# Patient Record
Sex: FEMALE | Race: WHITE | NOT HISPANIC OR LATINO
[De-identification: names, ages, dates, MRNs, and addresses within clinical notes are randomized per-mention and may not be internally consistent; named-entity substitution may affect disease eponyms.]

---

## 2017-01-25 ENCOUNTER — FORM ENCOUNTER (OUTPATIENT)
Age: 82
End: 2017-01-25

## 2017-02-09 ENCOUNTER — FORM ENCOUNTER (OUTPATIENT)
Age: 82
End: 2017-02-09

## 2017-04-11 ENCOUNTER — APPOINTMENT (OUTPATIENT)
Dept: DERMATOLOGY | Facility: CLINIC | Age: 82
End: 2017-04-11

## 2017-08-23 ENCOUNTER — EMERGENCY (EMERGENCY)
Facility: HOSPITAL | Age: 82
LOS: 1 days | Discharge: HOME | End: 2017-08-23
Attending: EMERGENCY MEDICINE
Payer: MEDICARE

## 2017-08-23 VITALS
OXYGEN SATURATION: 98 % | RESPIRATION RATE: 16 BRPM | HEART RATE: 84 BPM | DIASTOLIC BLOOD PRESSURE: 84 MMHG | TEMPERATURE: 98 F | SYSTOLIC BLOOD PRESSURE: 132 MMHG

## 2017-08-23 VITALS
DIASTOLIC BLOOD PRESSURE: 69 MMHG | RESPIRATION RATE: 20 BRPM | SYSTOLIC BLOOD PRESSURE: 100 MMHG | HEIGHT: 64 IN | TEMPERATURE: 98 F | OXYGEN SATURATION: 97 % | HEART RATE: 90 BPM | WEIGHT: 145.06 LBS

## 2017-08-23 LAB
ANION GAP SERPL CALC-SCNC: 13 MMOL/L — SIGNIFICANT CHANGE UP (ref 5–17)
APPEARANCE UR: CLEAR — SIGNIFICANT CHANGE UP
APTT BLD: 37.4 SEC — SIGNIFICANT CHANGE UP (ref 27.5–37.4)
BACTERIA # UR AUTO: ABNORMAL
BASOPHILS # BLD AUTO: 0 K/UL — SIGNIFICANT CHANGE UP (ref 0–0.2)
BASOPHILS NFR BLD AUTO: 0.2 % — SIGNIFICANT CHANGE UP (ref 0–2)
BILIRUB UR-MCNC: ABNORMAL
BLD GP AB SCN SERPL QL: SIGNIFICANT CHANGE UP
BUN SERPL-MCNC: 31 MG/DL — HIGH (ref 8–20)
CALCIUM SERPL-MCNC: 9.2 MG/DL — SIGNIFICANT CHANGE UP (ref 8.6–10.2)
CHLORIDE SERPL-SCNC: 105 MMOL/L — SIGNIFICANT CHANGE UP (ref 98–107)
CO2 SERPL-SCNC: 24 MMOL/L — SIGNIFICANT CHANGE UP (ref 22–29)
COLOR SPEC: YELLOW — SIGNIFICANT CHANGE UP
CREAT SERPL-MCNC: 1.04 MG/DL — SIGNIFICANT CHANGE UP (ref 0.5–1.3)
DIFF PNL FLD: ABNORMAL
EOSINOPHIL # BLD AUTO: 0.1 K/UL — SIGNIFICANT CHANGE UP (ref 0–0.5)
EOSINOPHIL NFR BLD AUTO: 1.1 % — SIGNIFICANT CHANGE UP (ref 0–6)
EPI CELLS # UR: SIGNIFICANT CHANGE UP
GLUCOSE SERPL-MCNC: 102 MG/DL — SIGNIFICANT CHANGE UP (ref 70–115)
GLUCOSE UR QL: NEGATIVE MG/DL — SIGNIFICANT CHANGE UP
HCT VFR BLD CALC: 42 % — SIGNIFICANT CHANGE UP (ref 37–47)
HGB BLD-MCNC: 13.9 G/DL — SIGNIFICANT CHANGE UP (ref 12–16)
HYALINE CASTS # UR AUTO: ABNORMAL /LPF
INR BLD: 2.29 RATIO — HIGH (ref 0.88–1.16)
KETONES UR-MCNC: ABNORMAL
LEUKOCYTE ESTERASE UR-ACNC: ABNORMAL
LYMPHOCYTES # BLD AUTO: 1 K/UL — SIGNIFICANT CHANGE UP (ref 1–4.8)
LYMPHOCYTES # BLD AUTO: 11.7 % — LOW (ref 20–55)
MCHC RBC-ENTMCNC: 30.3 PG — SIGNIFICANT CHANGE UP (ref 27–31)
MCHC RBC-ENTMCNC: 33.1 G/DL — SIGNIFICANT CHANGE UP (ref 32–36)
MCV RBC AUTO: 91.5 FL — SIGNIFICANT CHANGE UP (ref 81–99)
MONOCYTES # BLD AUTO: 0.8 K/UL — SIGNIFICANT CHANGE UP (ref 0–0.8)
MONOCYTES NFR BLD AUTO: 8.7 % — SIGNIFICANT CHANGE UP (ref 3–10)
NEUTROPHILS # BLD AUTO: 7 K/UL — SIGNIFICANT CHANGE UP (ref 1.8–8)
NEUTROPHILS NFR BLD AUTO: 78.2 % — HIGH (ref 37–73)
NITRITE UR-MCNC: NEGATIVE — SIGNIFICANT CHANGE UP
PH UR: 5 — SIGNIFICANT CHANGE UP (ref 5–8)
PLATELET # BLD AUTO: 225 K/UL — SIGNIFICANT CHANGE UP (ref 150–400)
POTASSIUM SERPL-MCNC: 4.4 MMOL/L — SIGNIFICANT CHANGE UP (ref 3.5–5.3)
POTASSIUM SERPL-SCNC: 4.4 MMOL/L — SIGNIFICANT CHANGE UP (ref 3.5–5.3)
PROT UR-MCNC: 100 MG/DL
PROTHROM AB SERPL-ACNC: 25.6 SEC — HIGH (ref 9.8–12.7)
RBC # BLD: 4.59 M/UL — SIGNIFICANT CHANGE UP (ref 4.4–5.2)
RBC # FLD: 14.2 % — SIGNIFICANT CHANGE UP (ref 11–15.6)
RBC CASTS # UR COMP ASSIST: ABNORMAL /HPF (ref 0–4)
SODIUM SERPL-SCNC: 142 MMOL/L — SIGNIFICANT CHANGE UP (ref 135–145)
SP GR SPEC: 1.02 — SIGNIFICANT CHANGE UP (ref 1.01–1.02)
TYPE + AB SCN PNL BLD: SIGNIFICANT CHANGE UP
UROBILINOGEN FLD QL: 1 MG/DL
WBC # BLD: 9 K/UL — SIGNIFICANT CHANGE UP (ref 4.8–10.8)
WBC # FLD AUTO: 9 K/UL — SIGNIFICANT CHANGE UP (ref 4.8–10.8)
WBC UR QL: ABNORMAL

## 2017-08-23 PROCEDURE — 80048 BASIC METABOLIC PNL TOTAL CA: CPT

## 2017-08-23 PROCEDURE — 70450 CT HEAD/BRAIN W/O DYE: CPT | Mod: 26

## 2017-08-23 PROCEDURE — 87086 URINE CULTURE/COLONY COUNT: CPT

## 2017-08-23 PROCEDURE — 86900 BLOOD TYPING SEROLOGIC ABO: CPT

## 2017-08-23 PROCEDURE — 76856 US EXAM PELVIC COMPLETE: CPT

## 2017-08-23 PROCEDURE — 36415 COLL VENOUS BLD VENIPUNCTURE: CPT

## 2017-08-23 PROCEDURE — 99284 EMERGENCY DEPT VISIT MOD MDM: CPT

## 2017-08-23 PROCEDURE — 85027 COMPLETE CBC AUTOMATED: CPT

## 2017-08-23 PROCEDURE — 87186 SC STD MICRODIL/AGAR DIL: CPT

## 2017-08-23 PROCEDURE — 85610 PROTHROMBIN TIME: CPT

## 2017-08-23 PROCEDURE — 76856 US EXAM PELVIC COMPLETE: CPT | Mod: 26

## 2017-08-23 PROCEDURE — 86901 BLOOD TYPING SEROLOGIC RH(D): CPT

## 2017-08-23 PROCEDURE — 85730 THROMBOPLASTIN TIME PARTIAL: CPT

## 2017-08-23 PROCEDURE — 76830 TRANSVAGINAL US NON-OB: CPT | Mod: 26

## 2017-08-23 PROCEDURE — 70450 CT HEAD/BRAIN W/O DYE: CPT

## 2017-08-23 PROCEDURE — 76830 TRANSVAGINAL US NON-OB: CPT

## 2017-08-23 PROCEDURE — 81001 URINALYSIS AUTO W/SCOPE: CPT

## 2017-08-23 PROCEDURE — 99284 EMERGENCY DEPT VISIT MOD MDM: CPT | Mod: 25

## 2017-08-23 PROCEDURE — 86850 RBC ANTIBODY SCREEN: CPT

## 2017-08-23 RX ORDER — NITROFURANTOIN MACROCRYSTAL 50 MG
1 CAPSULE ORAL
Qty: 14 | Refills: 0
Start: 2017-08-23 | End: 2017-08-30

## 2017-08-23 RX ORDER — NITROFURANTOIN MACROCRYSTAL 50 MG
100 CAPSULE ORAL ONCE
Qty: 0 | Refills: 0 | Status: COMPLETED | OUTPATIENT
Start: 2017-08-23 | End: 2017-08-23

## 2017-08-23 RX ADMIN — Medication 100 MILLIGRAM(S): at 21:00

## 2017-08-23 NOTE — ED STATDOCS - CONDUCTED A DETAILED DISCUSSION WITH PATIENT AND/OR GUARDIAN REGARDING, MDM
radiology results/lab results radiology results/return to ED if symptoms worsen, persist or questions arise/lab results/need for outpatient follow-up

## 2017-08-23 NOTE — ED STATDOCS - MEDICAL DECISION MAKING DETAILS
hemorraghic cystitis vs endometrial ca vs super therapeutic INR. Labs, Ultra sound and re-evaluate. Painless vaginal bleeding. Pt found to ave UTI, which we will tx. Arranged expedient outpat f/u with GYN within 48 hours for occult malignancy work up.  Pt is lucid and wants to leave. Instructed on risks given head trauma on blood thinner and she want to AMA.

## 2017-08-23 NOTE — ED STATDOCS - NS_ ATTENDINGSCRIBEDETAILS _ED_A_ED_FT
I, Velasquez Jiménez, performed the initial face to face bedside interview with this patient regarding history of present illness, review of symptoms and relevant past medical, social and family history.  I completed an independent physical examination.  I was the initial provider who evaluated this patient. I have signed out the follow up of any pending tests (i.e. labs, radiological studies) to the ACP.  I have communicated the patient’s plan of care and disposition with the ACP.

## 2017-08-23 NOTE — ED STATDOCS - PROGRESS NOTE DETAILS
PA NOTE: Pt discussed with attending at length, HPI/ROS/PE confirmed, pt seen resting comfortably in no acute distress complaining of painless vaginal bleeding that started yesterday, Pt denies any other complaints at this time, will revaluate after results come back. PA NOTE: PA called to bed side after nurse reports pt fell while trying to sit in chair, pt sat down on open foot rest, chair tipped pt states she hit her head when she fell, she first fell on her bottom then hand then head, she denies LOC whiteness say she did not LOC, pt denies injury other than small abrasion on her Rt wrist. PT denies HA, N/V, Dizziness, Loss of ROM, Neck pain, back pain, joint pain, numbness tingling.   HEAD: atraumatic, non tender, MS: NANY, strength intact, no tenderness, no anatomical snuff box tenderness. Neuro: no focal deficits, a&o x3.  PLAN: CT head revaluate. PA NOTE: Dr. Jaydon Jiménez OBGYN oncology was called about pt abnormal US findings, he recommended pt be seen in his office at the end of the Week. PA NOTE: PT resting comfortably in no acute distress, pt informed of all findings, pt refuses to stay for observation and will be leaving AMA, PT will be given ABx, educated about when to return to the ED, Pt is aware of risks associated with leaving AMA and still would like to leave, PT verbalizes she understands all results and instructions.

## 2017-08-23 NOTE — ED ADULT NURSE REASSESSMENT NOTE - NS ED NURSE REASSESS COMMENT FT1
pt trip and fell trying to sit into chair. pt has mild edema and ecchymosis to rt hand and wrist, small abrasion as well. pt denies loc. pt reports hit head during fall. pt has no complaints at this time. julien dumont and eneida michaels informed. pt wassafley placed back into chair. a and ox3. breathing even and unlabored. will continue to monitor.

## 2017-08-23 NOTE — ED STATDOCS - OBJECTIVE STATEMENT
87 y/o F pt with a PMHx of Parkinson's disease, Afib presents to the ED c/o vaginal bleeding that onset 1 day. She states that she went threw 3 pairs of underwear and 1 pad. Pt is on Warfarin. Yesterday she had a nurse draw her blood 1 day ago, when she was informed today that her blood is too thin. Denies recent weight loss, n/v/d/c, burning on urination, melena, fever, chills, abdominal pain or any other complaints. NKDA.  Denies Hx of hysterectomy.

## 2017-08-29 ENCOUNTER — FORM ENCOUNTER (OUTPATIENT)
Age: 82
End: 2017-08-29

## 2017-09-04 ENCOUNTER — FORM ENCOUNTER (OUTPATIENT)
Age: 82
End: 2017-09-04

## 2017-09-11 ENCOUNTER — APPOINTMENT (OUTPATIENT)
Dept: ORTHOPEDIC SURGERY | Facility: CLINIC | Age: 82
End: 2017-09-11
Payer: MEDICARE

## 2017-09-11 VITALS
HEART RATE: 80 BPM | WEIGHT: 145 LBS | HEIGHT: 64 IN | SYSTOLIC BLOOD PRESSURE: 133 MMHG | DIASTOLIC BLOOD PRESSURE: 84 MMHG | BODY MASS INDEX: 24.75 KG/M2

## 2017-09-11 DIAGNOSIS — Z60.2 PROBLEMS RELATED TO LIVING ALONE: ICD-10-CM

## 2017-09-11 DIAGNOSIS — Z78.9 OTHER SPECIFIED HEALTH STATUS: ICD-10-CM

## 2017-09-11 DIAGNOSIS — Z87.891 PERSONAL HISTORY OF NICOTINE DEPENDENCE: ICD-10-CM

## 2017-09-11 DIAGNOSIS — Z86.39 PERSONAL HISTORY OF OTHER ENDOCRINE, NUTRITIONAL AND METABOLIC DISEASE: ICD-10-CM

## 2017-09-11 DIAGNOSIS — Z86.79 PERSONAL HISTORY OF OTHER DISEASES OF THE CIRCULATORY SYSTEM: ICD-10-CM

## 2017-09-11 DIAGNOSIS — G20 PARKINSON'S DISEASE: ICD-10-CM

## 2017-09-11 PROCEDURE — 99204 OFFICE O/P NEW MOD 45 MIN: CPT | Mod: 25

## 2017-09-11 PROCEDURE — 73110 X-RAY EXAM OF WRIST: CPT | Mod: LT

## 2017-09-11 PROCEDURE — 20550 NJX 1 TENDON SHEATH/LIGAMENT: CPT | Mod: LT

## 2017-09-11 SDOH — SOCIAL STABILITY - SOCIAL INSECURITY: PROBLEMS RELATED TO LIVING ALONE: Z60.2

## 2017-09-12 PROBLEM — Z00.00 ENCOUNTER FOR PREVENTIVE HEALTH EXAMINATION: Noted: 2017-09-12

## 2017-09-13 ENCOUNTER — FORM ENCOUNTER (OUTPATIENT)
Age: 82
End: 2017-09-13

## 2017-09-13 ENCOUNTER — OUTPATIENT (OUTPATIENT)
Dept: OUTPATIENT SERVICES | Facility: HOSPITAL | Age: 82
LOS: 1 days | End: 2017-09-13

## 2017-09-13 ENCOUNTER — APPOINTMENT (OUTPATIENT)
Dept: MRI IMAGING | Facility: CLINIC | Age: 82
End: 2017-09-13
Payer: MEDICARE

## 2017-09-13 DIAGNOSIS — D25.9 LEIOMYOMA OF UTERUS, UNSPECIFIED: ICD-10-CM

## 2017-09-13 PROCEDURE — 72197 MRI PELVIS W/O & W/DYE: CPT | Mod: 26

## 2017-09-19 ENCOUNTER — FORM ENCOUNTER (OUTPATIENT)
Age: 82
End: 2017-09-19

## 2017-09-25 ENCOUNTER — APPOINTMENT (OUTPATIENT)
Dept: PHYSICAL MEDICINE AND REHAB | Facility: CLINIC | Age: 82
End: 2017-09-25
Payer: MEDICARE

## 2017-09-25 VITALS
WEIGHT: 145 LBS | HEART RATE: 103 BPM | HEIGHT: 64 IN | OXYGEN SATURATION: 95 % | BODY MASS INDEX: 24.75 KG/M2 | DIASTOLIC BLOOD PRESSURE: 83 MMHG | SYSTOLIC BLOOD PRESSURE: 138 MMHG

## 2017-09-25 PROCEDURE — 95909 NRV CNDJ TST 5-6 STUDIES: CPT

## 2017-09-25 PROCEDURE — 95886 MUSC TEST DONE W/N TEST COMP: CPT | Mod: RT

## 2017-10-02 ENCOUNTER — FORM ENCOUNTER (OUTPATIENT)
Age: 82
End: 2017-10-02

## 2017-10-10 ENCOUNTER — RESULT REVIEW (OUTPATIENT)
Age: 82
End: 2017-10-10

## 2017-10-10 ENCOUNTER — APPOINTMENT (OUTPATIENT)
Dept: DERMATOLOGY | Facility: CLINIC | Age: 82
End: 2017-10-10
Payer: MEDICARE

## 2017-10-10 PROCEDURE — 17110 DESTRUCTION B9 LES UP TO 14: CPT

## 2017-10-10 PROCEDURE — 99213 OFFICE O/P EST LOW 20 MIN: CPT | Mod: 25

## 2017-11-03 ENCOUNTER — APPOINTMENT (OUTPATIENT)
Dept: ORTHOPEDIC SURGERY | Facility: CLINIC | Age: 82
End: 2017-11-03
Payer: MEDICARE

## 2017-11-03 VITALS
DIASTOLIC BLOOD PRESSURE: 84 MMHG | WEIGHT: 145 LBS | SYSTOLIC BLOOD PRESSURE: 138 MMHG | HEART RATE: 82 BPM | BODY MASS INDEX: 24.75 KG/M2 | HEIGHT: 64 IN

## 2017-11-03 PROCEDURE — 99212 OFFICE O/P EST SF 10 MIN: CPT | Mod: 25

## 2017-11-03 PROCEDURE — 20600 DRAIN/INJ JOINT/BURSA W/O US: CPT | Mod: RT

## 2017-11-05 ENCOUNTER — FORM ENCOUNTER (OUTPATIENT)
Age: 82
End: 2017-11-05

## 2017-11-21 ENCOUNTER — FORM ENCOUNTER (OUTPATIENT)
Age: 82
End: 2017-11-21

## 2018-01-08 ENCOUNTER — FORM ENCOUNTER (OUTPATIENT)
Age: 83
End: 2018-01-08

## 2018-01-09 ENCOUNTER — OUTPATIENT (OUTPATIENT)
Dept: OUTPATIENT SERVICES | Facility: HOSPITAL | Age: 83
LOS: 1 days | End: 2018-01-09
Payer: MEDICARE

## 2018-01-09 DIAGNOSIS — Z01.818 ENCOUNTER FOR OTHER PREPROCEDURAL EXAMINATION: ICD-10-CM

## 2018-01-09 LAB
ANION GAP SERPL CALC-SCNC: 12 MMOL/L — SIGNIFICANT CHANGE UP (ref 5–17)
APTT BLD: 47 SEC — HIGH (ref 27.5–37.4)
BASOPHILS # BLD AUTO: 0 K/UL — SIGNIFICANT CHANGE UP (ref 0–0.2)
BASOPHILS NFR BLD AUTO: 0.4 % — SIGNIFICANT CHANGE UP (ref 0–2)
BUN SERPL-MCNC: 23 MG/DL — HIGH (ref 8–20)
CALCIUM SERPL-MCNC: 9 MG/DL — SIGNIFICANT CHANGE UP (ref 8.6–10.2)
CHLORIDE SERPL-SCNC: 104 MMOL/L — SIGNIFICANT CHANGE UP (ref 98–107)
CO2 SERPL-SCNC: 27 MMOL/L — SIGNIFICANT CHANGE UP (ref 22–29)
CREAT SERPL-MCNC: 0.74 MG/DL — SIGNIFICANT CHANGE UP (ref 0.5–1.3)
EOSINOPHIL # BLD AUTO: 0 K/UL — SIGNIFICANT CHANGE UP (ref 0–0.5)
EOSINOPHIL NFR BLD AUTO: 0.3 % — SIGNIFICANT CHANGE UP (ref 0–6)
GLUCOSE SERPL-MCNC: 91 MG/DL — SIGNIFICANT CHANGE UP (ref 70–115)
HCT VFR BLD CALC: 42.4 % — SIGNIFICANT CHANGE UP (ref 37–47)
HGB BLD-MCNC: 13.9 G/DL — SIGNIFICANT CHANGE UP (ref 12–16)
INR BLD: 4.95 RATIO — HIGH (ref 0.88–1.16)
LYMPHOCYTES # BLD AUTO: 1.1 K/UL — SIGNIFICANT CHANGE UP (ref 1–4.8)
LYMPHOCYTES # BLD AUTO: 16.3 % — LOW (ref 20–55)
MCHC RBC-ENTMCNC: 30.3 PG — SIGNIFICANT CHANGE UP (ref 27–31)
MCHC RBC-ENTMCNC: 32.8 G/DL — SIGNIFICANT CHANGE UP (ref 32–36)
MCV RBC AUTO: 92.4 FL — SIGNIFICANT CHANGE UP (ref 81–99)
MONOCYTES # BLD AUTO: 0.6 K/UL — SIGNIFICANT CHANGE UP (ref 0–0.8)
MONOCYTES NFR BLD AUTO: 8.7 % — SIGNIFICANT CHANGE UP (ref 3–10)
NEUTROPHILS # BLD AUTO: 5.2 K/UL — SIGNIFICANT CHANGE UP (ref 1.8–8)
NEUTROPHILS NFR BLD AUTO: 74.3 % — HIGH (ref 37–73)
PLATELET # BLD AUTO: 241 K/UL — SIGNIFICANT CHANGE UP (ref 150–400)
POTASSIUM SERPL-MCNC: 4.6 MMOL/L — SIGNIFICANT CHANGE UP (ref 3.5–5.3)
POTASSIUM SERPL-SCNC: 4.6 MMOL/L — SIGNIFICANT CHANGE UP (ref 3.5–5.3)
PROTHROM AB SERPL-ACNC: 56.2 SEC — HIGH (ref 9.8–12.7)
RBC # BLD: 4.59 M/UL — SIGNIFICANT CHANGE UP (ref 4.4–5.2)
RBC # FLD: 13.5 % — SIGNIFICANT CHANGE UP (ref 11–15.6)
SODIUM SERPL-SCNC: 143 MMOL/L — SIGNIFICANT CHANGE UP (ref 135–145)
WBC # BLD: 6.9 K/UL — SIGNIFICANT CHANGE UP (ref 4.8–10.8)
WBC # FLD AUTO: 6.9 K/UL — SIGNIFICANT CHANGE UP (ref 4.8–10.8)

## 2018-01-09 PROCEDURE — 85027 COMPLETE CBC AUTOMATED: CPT

## 2018-01-09 PROCEDURE — 93010 ELECTROCARDIOGRAM REPORT: CPT

## 2018-01-09 PROCEDURE — 71046 X-RAY EXAM CHEST 2 VIEWS: CPT

## 2018-01-09 PROCEDURE — 93005 ELECTROCARDIOGRAM TRACING: CPT

## 2018-01-09 PROCEDURE — G0463: CPT

## 2018-01-09 PROCEDURE — 36415 COLL VENOUS BLD VENIPUNCTURE: CPT

## 2018-01-09 PROCEDURE — 85610 PROTHROMBIN TIME: CPT

## 2018-01-09 PROCEDURE — 85730 THROMBOPLASTIN TIME PARTIAL: CPT

## 2018-01-09 PROCEDURE — 80048 BASIC METABOLIC PNL TOTAL CA: CPT

## 2018-01-09 PROCEDURE — 71046 X-RAY EXAM CHEST 2 VIEWS: CPT | Mod: 26

## 2018-01-10 ENCOUNTER — APPOINTMENT (OUTPATIENT)
Dept: CARDIOLOGY | Facility: CLINIC | Age: 83
End: 2018-01-10
Payer: MEDICARE

## 2018-01-10 ENCOUNTER — NON-APPOINTMENT (OUTPATIENT)
Age: 83
End: 2018-01-10

## 2018-01-10 VITALS
HEART RATE: 77 BPM | WEIGHT: 139 LBS | HEIGHT: 64 IN | DIASTOLIC BLOOD PRESSURE: 68 MMHG | OXYGEN SATURATION: 99 % | SYSTOLIC BLOOD PRESSURE: 110 MMHG | BODY MASS INDEX: 23.73 KG/M2

## 2018-01-10 DIAGNOSIS — Z63.4 DISAPPEARANCE AND DEATH OF FAMILY MEMBER: ICD-10-CM

## 2018-01-10 DIAGNOSIS — Z09 ENCOUNTER FOR FOLLOW-UP EXAMINATION AFTER COMPLETED TREATMENT FOR CONDITIONS OTHER THAN MALIGNANT NEOPLASM: ICD-10-CM

## 2018-01-10 DIAGNOSIS — I10 ESSENTIAL (PRIMARY) HYPERTENSION: ICD-10-CM

## 2018-01-10 DIAGNOSIS — I48.0 PAROXYSMAL ATRIAL FIBRILLATION: ICD-10-CM

## 2018-01-10 PROCEDURE — 93000 ELECTROCARDIOGRAM COMPLETE: CPT

## 2018-01-10 PROCEDURE — 99203 OFFICE O/P NEW LOW 30 MIN: CPT | Mod: 25

## 2018-01-10 RX ORDER — ACETAMINOPHEN 325 MG/1
TABLET, FILM COATED ORAL
Refills: 0 | Status: COMPLETED | COMMUNITY
End: 2018-01-10

## 2018-01-10 SDOH — SOCIAL STABILITY - SOCIAL INSECURITY: DISSAPEARANCE AND DEATH OF FAMILY MEMBER: Z63.4

## 2018-01-16 ENCOUNTER — RESULT REVIEW (OUTPATIENT)
Age: 83
End: 2018-01-16

## 2018-01-16 ENCOUNTER — RX RENEWAL (OUTPATIENT)
Age: 83
End: 2018-01-16

## 2018-01-16 ENCOUNTER — APPOINTMENT (OUTPATIENT)
Dept: ORTHOPEDIC SURGERY | Facility: AMBULATORY SURGERY CENTER | Age: 83
End: 2018-01-16
Payer: MEDICARE

## 2018-01-16 PROCEDURE — 26055 INCISE FINGER TENDON SHEATH: CPT | Mod: F2

## 2018-01-16 RX ORDER — HYDROCODONE BITARTRATE AND ACETAMINOPHEN 5; 325 MG/1; MG/1
5-325 TABLET ORAL
Qty: 8 | Refills: 0 | Status: ACTIVE | COMMUNITY
Start: 2018-01-16 | End: 1900-01-01

## 2018-01-17 RX ORDER — RIVAROXABAN 20 MG/1
20 TABLET, FILM COATED ORAL
Qty: 30 | Refills: 3 | Status: DISCONTINUED | COMMUNITY
Start: 2018-01-16 | End: 2018-01-17

## 2018-01-26 ENCOUNTER — APPOINTMENT (OUTPATIENT)
Dept: ORTHOPEDIC SURGERY | Facility: CLINIC | Age: 83
End: 2018-01-26
Payer: MEDICARE

## 2018-01-26 VITALS
SYSTOLIC BLOOD PRESSURE: 128 MMHG | HEIGHT: 64 IN | BODY MASS INDEX: 23.73 KG/M2 | HEART RATE: 87 BPM | WEIGHT: 139 LBS | DIASTOLIC BLOOD PRESSURE: 82 MMHG

## 2018-01-26 DIAGNOSIS — M65.332 TRIGGER FINGER, LEFT MIDDLE FINGER: ICD-10-CM

## 2018-01-26 PROCEDURE — 99024 POSTOP FOLLOW-UP VISIT: CPT

## 2018-01-26 RX ORDER — LISINOPRIL 10 MG/1
10 TABLET ORAL
Qty: 90 | Refills: 0 | Status: ACTIVE | COMMUNITY
Start: 2017-01-24

## 2018-01-26 RX ORDER — ATORVASTATIN CALCIUM 10 MG/1
10 TABLET, FILM COATED ORAL
Qty: 90 | Refills: 0 | Status: ACTIVE | COMMUNITY
Start: 2017-01-24

## 2018-01-26 RX ORDER — DILTIAZEM HYDROCHLORIDE 360 MG/1
360 CAPSULE, COATED, EXTENDED RELEASE ORAL
Qty: 90 | Refills: 0 | Status: ACTIVE | COMMUNITY
Start: 2017-01-24

## 2018-02-26 ENCOUNTER — APPOINTMENT (OUTPATIENT)
Dept: ORTHOPEDIC SURGERY | Facility: CLINIC | Age: 83
End: 2018-02-26
Payer: MEDICARE

## 2018-02-26 VITALS
HEIGHT: 64 IN | SYSTOLIC BLOOD PRESSURE: 135 MMHG | BODY MASS INDEX: 23.73 KG/M2 | HEART RATE: 66 BPM | DIASTOLIC BLOOD PRESSURE: 82 MMHG | WEIGHT: 139 LBS

## 2018-02-26 DIAGNOSIS — M25.531 PAIN IN RIGHT WRIST: ICD-10-CM

## 2018-02-26 PROCEDURE — 99215 OFFICE O/P EST HI 40 MIN: CPT | Mod: 24

## 2018-02-26 PROCEDURE — 73110 X-RAY EXAM OF WRIST: CPT | Mod: RT

## 2018-03-08 ENCOUNTER — OUTPATIENT (OUTPATIENT)
Dept: OUTPATIENT SERVICES | Facility: HOSPITAL | Age: 83
LOS: 1 days | End: 2018-03-08
Payer: MEDICARE

## 2018-03-08 DIAGNOSIS — Z01.818 ENCOUNTER FOR OTHER PREPROCEDURAL EXAMINATION: ICD-10-CM

## 2018-03-08 LAB
ANION GAP SERPL CALC-SCNC: 12 MMOL/L — SIGNIFICANT CHANGE UP (ref 5–17)
APTT BLD: 43.5 SEC — HIGH (ref 27.5–37.4)
BASOPHILS # BLD AUTO: 0 K/UL — SIGNIFICANT CHANGE UP (ref 0–0.2)
BASOPHILS NFR BLD AUTO: 0.4 % — SIGNIFICANT CHANGE UP (ref 0–2)
BUN SERPL-MCNC: 25 MG/DL — HIGH (ref 8–20)
CALCIUM SERPL-MCNC: 9.3 MG/DL — SIGNIFICANT CHANGE UP (ref 8.6–10.2)
CHLORIDE SERPL-SCNC: 103 MMOL/L — SIGNIFICANT CHANGE UP (ref 98–107)
CO2 SERPL-SCNC: 26 MMOL/L — SIGNIFICANT CHANGE UP (ref 22–29)
CREAT SERPL-MCNC: 0.74 MG/DL — SIGNIFICANT CHANGE UP (ref 0.5–1.3)
EOSINOPHIL # BLD AUTO: 0 K/UL — SIGNIFICANT CHANGE UP (ref 0–0.5)
EOSINOPHIL NFR BLD AUTO: 0.7 % — SIGNIFICANT CHANGE UP (ref 0–6)
GLUCOSE SERPL-MCNC: 116 MG/DL — HIGH (ref 70–115)
HCT VFR BLD CALC: 41.7 % — SIGNIFICANT CHANGE UP (ref 37–47)
HGB BLD-MCNC: 13.8 G/DL — SIGNIFICANT CHANGE UP (ref 12–16)
INR BLD: 3.09 RATIO — HIGH (ref 0.88–1.16)
LYMPHOCYTES # BLD AUTO: 0.8 K/UL — LOW (ref 1–4.8)
LYMPHOCYTES # BLD AUTO: 14.3 % — LOW (ref 20–55)
MCHC RBC-ENTMCNC: 30.6 PG — SIGNIFICANT CHANGE UP (ref 27–31)
MCHC RBC-ENTMCNC: 33.1 G/DL — SIGNIFICANT CHANGE UP (ref 32–36)
MCV RBC AUTO: 92.5 FL — SIGNIFICANT CHANGE UP (ref 81–99)
MONOCYTES # BLD AUTO: 0.5 K/UL — SIGNIFICANT CHANGE UP (ref 0–0.8)
MONOCYTES NFR BLD AUTO: 9.1 % — SIGNIFICANT CHANGE UP (ref 3–10)
NEUTROPHILS # BLD AUTO: 4.2 K/UL — SIGNIFICANT CHANGE UP (ref 1.8–8)
NEUTROPHILS NFR BLD AUTO: 75.5 % — HIGH (ref 37–73)
PLATELET # BLD AUTO: 203 K/UL — SIGNIFICANT CHANGE UP (ref 150–400)
POTASSIUM SERPL-MCNC: 4.7 MMOL/L — SIGNIFICANT CHANGE UP (ref 3.5–5.3)
POTASSIUM SERPL-SCNC: 4.7 MMOL/L — SIGNIFICANT CHANGE UP (ref 3.5–5.3)
PROTHROM AB SERPL-ACNC: 34.8 SEC — HIGH (ref 9.8–12.7)
RBC # BLD: 4.51 M/UL — SIGNIFICANT CHANGE UP (ref 4.4–5.2)
RBC # FLD: 13.7 % — SIGNIFICANT CHANGE UP (ref 11–15.6)
SODIUM SERPL-SCNC: 141 MMOL/L — SIGNIFICANT CHANGE UP (ref 135–145)
WBC # BLD: 5.6 K/UL — SIGNIFICANT CHANGE UP (ref 4.8–10.8)
WBC # FLD AUTO: 5.6 K/UL — SIGNIFICANT CHANGE UP (ref 4.8–10.8)

## 2018-03-08 PROCEDURE — 85730 THROMBOPLASTIN TIME PARTIAL: CPT

## 2018-03-08 PROCEDURE — 36415 COLL VENOUS BLD VENIPUNCTURE: CPT

## 2018-03-08 PROCEDURE — 85610 PROTHROMBIN TIME: CPT

## 2018-03-08 PROCEDURE — 80048 BASIC METABOLIC PNL TOTAL CA: CPT

## 2018-03-08 PROCEDURE — 85027 COMPLETE CBC AUTOMATED: CPT

## 2018-03-08 PROCEDURE — G0463: CPT

## 2018-03-27 ENCOUNTER — APPOINTMENT (OUTPATIENT)
Dept: ORTHOPEDIC SURGERY | Facility: AMBULATORY SURGERY CENTER | Age: 83
End: 2018-03-27

## 2018-03-27 PROCEDURE — 64721 CARPAL TUNNEL SURGERY: CPT | Mod: 79,RT

## 2018-04-10 ENCOUNTER — FORM ENCOUNTER (OUTPATIENT)
Age: 83
End: 2018-04-10

## 2018-04-10 ENCOUNTER — APPOINTMENT (OUTPATIENT)
Dept: ORTHOPEDIC SURGERY | Facility: CLINIC | Age: 83
End: 2018-04-10
Payer: MEDICARE

## 2018-04-10 VITALS
WEIGHT: 139 LBS | HEIGHT: 64 IN | BODY MASS INDEX: 23.73 KG/M2 | HEART RATE: 92 BPM | DIASTOLIC BLOOD PRESSURE: 76 MMHG | SYSTOLIC BLOOD PRESSURE: 106 MMHG

## 2018-04-10 DIAGNOSIS — G56.01 CARPAL TUNNEL SYNDROME, RIGHT UPPER LIMB: ICD-10-CM

## 2018-04-10 DIAGNOSIS — Z98.890 OTHER SPECIFIED POSTPROCEDURAL STATES: ICD-10-CM

## 2018-04-10 PROCEDURE — 99024 POSTOP FOLLOW-UP VISIT: CPT

## 2018-05-22 ENCOUNTER — RESULT REVIEW (OUTPATIENT)
Age: 83
End: 2018-05-22

## 2018-05-22 ENCOUNTER — APPOINTMENT (OUTPATIENT)
Dept: DERMATOLOGY | Facility: CLINIC | Age: 83
End: 2018-05-22
Payer: MEDICARE

## 2018-05-22 PROCEDURE — 11100 BX SKIN SUBCUTANEOUS&/MUCOUS MEMBRANE 1 LESION: CPT

## 2018-05-22 PROCEDURE — 99213 OFFICE O/P EST LOW 20 MIN: CPT | Mod: 25

## 2018-05-31 ENCOUNTER — FORM ENCOUNTER (OUTPATIENT)
Age: 83
End: 2018-05-31

## 2018-07-16 PROBLEM — I10 ESSENTIAL (PRIMARY) HYPERTENSION: Chronic | Status: ACTIVE | Noted: 2017-08-23

## 2018-07-16 PROBLEM — I48.91 UNSPECIFIED ATRIAL FIBRILLATION: Chronic | Status: ACTIVE | Noted: 2017-08-23

## 2018-07-16 PROBLEM — G20 PARKINSON'S DISEASE: Chronic | Status: ACTIVE | Noted: 2017-08-23

## 2018-08-01 ENCOUNTER — RECORD ABSTRACTING (OUTPATIENT)
Age: 83
End: 2018-08-01

## 2018-08-01 DIAGNOSIS — Z86.2 PERSONAL HISTORY OF DISEASES OF THE BLOOD AND BLOOD-FORMING ORGANS AND CERTAIN DISORDERS INVOLVING THE IMMUNE MECHANISM: ICD-10-CM

## 2018-08-01 DIAGNOSIS — M19.90 UNSPECIFIED OSTEOARTHRITIS, UNSPECIFIED SITE: ICD-10-CM

## 2018-08-01 DIAGNOSIS — Z90.49 ACQUIRED ABSENCE OF OTHER SPECIFIED PARTS OF DIGESTIVE TRACT: ICD-10-CM

## 2018-08-01 DIAGNOSIS — N95.0 POSTMENOPAUSAL BLEEDING: ICD-10-CM

## 2018-08-01 DIAGNOSIS — K21.9 GASTRO-ESOPHAGEAL REFLUX DISEASE W/OUT ESOPHAGITIS: ICD-10-CM

## 2018-08-01 DIAGNOSIS — E78.5 HYPERLIPIDEMIA, UNSPECIFIED: ICD-10-CM

## 2018-08-01 RX ORDER — OMEPRAZOLE 40 MG/1
40 CAPSULE, DELAYED RELEASE ORAL
Refills: 0 | Status: ACTIVE | COMMUNITY

## 2018-10-09 ENCOUNTER — FORM ENCOUNTER (OUTPATIENT)
Age: 83
End: 2018-10-09

## 2018-11-13 ENCOUNTER — APPOINTMENT (OUTPATIENT)
Dept: DERMATOLOGY | Facility: CLINIC | Age: 83
End: 2018-11-13
Payer: MEDICARE

## 2018-11-13 PROCEDURE — 99214 OFFICE O/P EST MOD 30 MIN: CPT

## 2018-11-14 ENCOUNTER — APPOINTMENT (OUTPATIENT)
Dept: GYNECOLOGIC ONCOLOGY | Facility: CLINIC | Age: 83
End: 2018-11-14
Payer: MEDICARE

## 2018-11-14 VITALS
SYSTOLIC BLOOD PRESSURE: 125 MMHG | WEIGHT: 145 LBS | RESPIRATION RATE: 16 BRPM | HEIGHT: 62 IN | BODY MASS INDEX: 26.68 KG/M2 | HEART RATE: 70 BPM | DIASTOLIC BLOOD PRESSURE: 76 MMHG | OXYGEN SATURATION: 98 %

## 2018-11-14 DIAGNOSIS — Z98.890 OTHER SPECIFIED POSTPROCEDURAL STATES: ICD-10-CM

## 2018-11-14 PROCEDURE — 76857 US EXAM PELVIC LIMITED: CPT | Mod: 59

## 2018-11-14 PROCEDURE — 99214 OFFICE O/P EST MOD 30 MIN: CPT | Mod: 25

## 2018-11-14 PROCEDURE — 76830 TRANSVAGINAL US NON-OB: CPT | Mod: 59

## 2018-11-14 RX ORDER — APIXABAN 5 MG/1
5 TABLET, FILM COATED ORAL
Qty: 60 | Refills: 3 | Status: DISCONTINUED | COMMUNITY
Start: 2018-01-17 | End: 2018-11-14

## 2018-11-14 RX ORDER — WARFARIN SODIUM 6 MG/1
TABLET ORAL
Refills: 0 | Status: ACTIVE | COMMUNITY

## 2019-05-15 ENCOUNTER — APPOINTMENT (OUTPATIENT)
Dept: GYNECOLOGIC ONCOLOGY | Facility: CLINIC | Age: 84
End: 2019-05-15
Payer: MEDICARE

## 2019-05-15 VITALS
RESPIRATION RATE: 16 BRPM | DIASTOLIC BLOOD PRESSURE: 79 MMHG | BODY MASS INDEX: 24.84 KG/M2 | SYSTOLIC BLOOD PRESSURE: 131 MMHG | WEIGHT: 135 LBS | HEIGHT: 62 IN | HEART RATE: 98 BPM | OXYGEN SATURATION: 97 %

## 2019-05-15 DIAGNOSIS — N83.209 UNSPECIFIED OVARIAN CYST, UNSPECIFIED SIDE: ICD-10-CM

## 2019-05-15 PROCEDURE — 99214 OFFICE O/P EST MOD 30 MIN: CPT | Mod: 25

## 2019-05-15 PROCEDURE — 76830 TRANSVAGINAL US NON-OB: CPT | Mod: 59

## 2019-05-15 PROCEDURE — 76857 US EXAM PELVIC LIMITED: CPT | Mod: 59

## 2019-05-16 ENCOUNTER — APPOINTMENT (OUTPATIENT)
Dept: DERMATOLOGY | Facility: CLINIC | Age: 84
End: 2019-05-16

## 2019-05-16 NOTE — ASSESSMENT
[FreeTextEntry1] : 89 y/o with stable left adnexal cyst and fibroids, without gynecological complaints or symptoms \par \par Ultrasound today showed a 3.4cm left adnexal which is stable (previously measured 4.4cm in November), stable fibroid

## 2019-05-16 NOTE — HISTORY OF PRESENT ILLNESS
[FreeTextEntry1] : 89 y/o with a left adnexal mass as well as fibroids that I am following conservatively since 08/2017 returns to the office today for an ultrasound. Last ultrasound from 11/14/2018 revealed stable left adnexal cyst measuring 4.4 cm and stable fibroid. Patient denies any gynecological complaints. She denies pain or vaginal bleeding. She suffers from Parkinsons and believes she has a sinus infection right now.

## 2019-05-16 NOTE — REASON FOR VISIT
[FreeTextEntry1] : [facility]\par \par [facility] [phone] at [address] [place] [id number]\par

## 2019-05-16 NOTE — PHYSICAL EXAM
[Normal] : No focal neurologic defects observed [de-identified] : Aster Rose MA was present the entire time of results and discussion

## 2019-05-16 NOTE — END OF VISIT
[FreeTextEntry3] : Written by Aster Rose, acting as a scribe for Dr. Kwesi Jiménez.\par This note accurately reflects the work and decision made by me.\par

## 2019-06-06 ENCOUNTER — APPOINTMENT (OUTPATIENT)
Dept: DERMATOLOGY | Facility: CLINIC | Age: 84
End: 2019-06-06
Payer: MEDICARE

## 2019-06-06 PROCEDURE — 99214 OFFICE O/P EST MOD 30 MIN: CPT | Mod: 25

## 2019-06-06 PROCEDURE — 17000 DESTRUCT PREMALG LESION: CPT

## 2019-06-06 PROCEDURE — 17003 DESTRUCT PREMALG LES 2-14: CPT

## 2019-12-12 ENCOUNTER — APPOINTMENT (OUTPATIENT)
Dept: DERMATOLOGY | Facility: CLINIC | Age: 84
End: 2019-12-12
Payer: MEDICARE

## 2019-12-12 PROCEDURE — 99213 OFFICE O/P EST LOW 20 MIN: CPT

## 2020-08-15 ENCOUNTER — APPOINTMENT (OUTPATIENT)
Dept: DERMATOLOGY | Facility: CLINIC | Age: 85
End: 2020-08-15

## 2021-06-17 ENCOUNTER — APPOINTMENT (OUTPATIENT)
Dept: DERMATOLOGY | Facility: CLINIC | Age: 86
End: 2021-06-17
Payer: MEDICARE

## 2021-06-17 PROCEDURE — 99072 ADDL SUPL MATRL&STAF TM PHE: CPT

## 2021-06-17 PROCEDURE — 99213 OFFICE O/P EST LOW 20 MIN: CPT

## 2022-01-01 ENCOUNTER — APPOINTMENT (OUTPATIENT)
Dept: DERMATOLOGY | Facility: CLINIC | Age: 87
End: 2022-01-01

## 2022-01-01 ENCOUNTER — TRANSCRIPTION ENCOUNTER (OUTPATIENT)
Age: 87
End: 2022-01-01

## 2022-01-01 ENCOUNTER — RESULT REVIEW (OUTPATIENT)
Age: 87
End: 2022-01-01

## 2022-01-01 ENCOUNTER — INPATIENT (INPATIENT)
Facility: HOSPITAL | Age: 87
LOS: 7 days | Discharge: NURSING, REHAB OR LONG-TERM CARE | DRG: 309 | End: 2022-08-22
Attending: STUDENT IN AN ORGANIZED HEALTH CARE EDUCATION/TRAINING PROGRAM | Admitting: HOSPITALIST
Payer: MEDICARE

## 2022-01-01 ENCOUNTER — NON-APPOINTMENT (OUTPATIENT)
Age: 87
End: 2022-01-01

## 2022-01-01 VITALS
RESPIRATION RATE: 13 BRPM | OXYGEN SATURATION: 94 % | TEMPERATURE: 98 F | SYSTOLIC BLOOD PRESSURE: 98 MMHG | HEART RATE: 63 BPM | DIASTOLIC BLOOD PRESSURE: 60 MMHG

## 2022-01-01 VITALS — HEIGHT: 64 IN
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
## 2022-01-01 DIAGNOSIS — I48.20 CHRONIC ATRIAL FIBRILLATION, UNSPECIFIED: ICD-10-CM

## 2022-01-01 DIAGNOSIS — I10 ESSENTIAL (PRIMARY) HYPERTENSION: ICD-10-CM

## 2022-01-01 DIAGNOSIS — I48.91 UNSPECIFIED ATRIAL FIBRILLATION: ICD-10-CM

## 2022-01-01 LAB
ALBUMIN SERPL ELPH-MCNC: 2.9 G/DL — LOW (ref 3.3–5.2)
ALBUMIN SERPL ELPH-MCNC: 3.4 G/DL — SIGNIFICANT CHANGE UP (ref 3.3–5.2)
ALP SERPL-CCNC: 108 U/L — SIGNIFICANT CHANGE UP (ref 40–120)
ALP SERPL-CCNC: 119 U/L — SIGNIFICANT CHANGE UP (ref 40–120)
ALT FLD-CCNC: 39 U/L — HIGH
ALT FLD-CCNC: 41 U/L — HIGH
ANION GAP SERPL CALC-SCNC: 10 MMOL/L — SIGNIFICANT CHANGE UP (ref 5–17)
ANION GAP SERPL CALC-SCNC: 13 MMOL/L — SIGNIFICANT CHANGE UP (ref 5–17)
ANION GAP SERPL CALC-SCNC: 8 MMOL/L — SIGNIFICANT CHANGE UP (ref 5–17)
APPEARANCE UR: ABNORMAL
APPEARANCE UR: CLEAR — SIGNIFICANT CHANGE UP
APPEARANCE UR: CLEAR — SIGNIFICANT CHANGE UP
APTT BLD: 30.6 SEC — SIGNIFICANT CHANGE UP (ref 27.5–35.5)
AST SERPL-CCNC: 94 U/L — HIGH
AST SERPL-CCNC: 97 U/L — HIGH
BACTERIA # UR AUTO: ABNORMAL
BASE EXCESS BLDV CALC-SCNC: 8.3 MMOL/L — HIGH (ref -2–3)
BASOPHILS # BLD AUTO: 0 K/UL — SIGNIFICANT CHANGE UP (ref 0–0.2)
BASOPHILS NFR BLD AUTO: 0 % — SIGNIFICANT CHANGE UP (ref 0–2)
BILIRUB SERPL-MCNC: 1.3 MG/DL — SIGNIFICANT CHANGE UP (ref 0.4–2)
BILIRUB SERPL-MCNC: 1.4 MG/DL — SIGNIFICANT CHANGE UP (ref 0.4–2)
BILIRUB UR-MCNC: ABNORMAL
BILIRUB UR-MCNC: NEGATIVE — SIGNIFICANT CHANGE UP
BILIRUB UR-MCNC: NEGATIVE — SIGNIFICANT CHANGE UP
BLD GP AB SCN SERPL QL: SIGNIFICANT CHANGE UP
BUN SERPL-MCNC: 29.6 MG/DL — HIGH (ref 8–20)
BUN SERPL-MCNC: 34.6 MG/DL — HIGH (ref 8–20)
BUN SERPL-MCNC: 36.4 MG/DL — HIGH (ref 8–20)
BUN SERPL-MCNC: 37.1 MG/DL — HIGH (ref 8–20)
BUN SERPL-MCNC: 41.1 MG/DL — HIGH (ref 8–20)
CALCIUM SERPL-MCNC: 8.3 MG/DL — LOW (ref 8.4–10.5)
CALCIUM SERPL-MCNC: 8.4 MG/DL — SIGNIFICANT CHANGE UP (ref 8.4–10.5)
CALCIUM SERPL-MCNC: 8.5 MG/DL — SIGNIFICANT CHANGE UP (ref 8.4–10.5)
CALCIUM SERPL-MCNC: 9 MG/DL — SIGNIFICANT CHANGE UP (ref 8.4–10.5)
CALCIUM SERPL-MCNC: 9 MG/DL — SIGNIFICANT CHANGE UP (ref 8.4–10.5)
CHLORIDE SERPL-SCNC: 101 MMOL/L — SIGNIFICANT CHANGE UP (ref 98–107)
CHLORIDE SERPL-SCNC: 104 MMOL/L — SIGNIFICANT CHANGE UP (ref 98–107)
CHLORIDE SERPL-SCNC: 105 MMOL/L — SIGNIFICANT CHANGE UP (ref 98–107)
CHLORIDE SERPL-SCNC: 107 MMOL/L — SIGNIFICANT CHANGE UP (ref 98–107)
CHLORIDE SERPL-SCNC: 108 MMOL/L — HIGH (ref 98–107)
CK MB CFR SERPL CALC: 12 NG/ML — HIGH (ref 0–6.7)
CK MB CFR SERPL CALC: 12.6 NG/ML — HIGH (ref 0–6.7)
CK MB CFR SERPL CALC: 27.5 NG/ML — HIGH (ref 0–6.7)
CK MB CFR SERPL CALC: 35.5 NG/ML — HIGH (ref 0–6.7)
CK SERPL-CCNC: 2345 U/L — HIGH (ref 25–170)
CK SERPL-CCNC: 3652 U/L — HIGH (ref 25–170)
CK SERPL-CCNC: 794 U/L — HIGH (ref 25–170)
CK SERPL-CCNC: 85 U/L — SIGNIFICANT CHANGE UP (ref 25–170)
CK SERPL-CCNC: 991 U/L — HIGH (ref 25–170)
CO2 SERPL-SCNC: 22 MMOL/L — SIGNIFICANT CHANGE UP (ref 22–29)
CO2 SERPL-SCNC: 24 MMOL/L — SIGNIFICANT CHANGE UP (ref 22–29)
CO2 SERPL-SCNC: 26 MMOL/L — SIGNIFICANT CHANGE UP (ref 22–29)
COLOR SPEC: YELLOW — SIGNIFICANT CHANGE UP
CREAT SERPL-MCNC: 0.71 MG/DL — SIGNIFICANT CHANGE UP (ref 0.5–1.3)
CREAT SERPL-MCNC: 0.72 MG/DL — SIGNIFICANT CHANGE UP (ref 0.5–1.3)
CREAT SERPL-MCNC: 0.74 MG/DL — SIGNIFICANT CHANGE UP (ref 0.5–1.3)
CREAT SERPL-MCNC: 0.87 MG/DL — SIGNIFICANT CHANGE UP (ref 0.5–1.3)
CREAT SERPL-MCNC: 1.11 MG/DL — SIGNIFICANT CHANGE UP (ref 0.5–1.3)
CULTURE RESULTS: SIGNIFICANT CHANGE UP
CULTURE RESULTS: SIGNIFICANT CHANGE UP
DIFF PNL FLD: ABNORMAL
DIFF PNL FLD: ABNORMAL
DIFF PNL FLD: NEGATIVE — SIGNIFICANT CHANGE UP
EGFR: 47 ML/MIN/1.73M2 — LOW
EGFR: 63 ML/MIN/1.73M2 — SIGNIFICANT CHANGE UP
EGFR: 76 ML/MIN/1.73M2 — SIGNIFICANT CHANGE UP
EGFR: 79 ML/MIN/1.73M2 — SIGNIFICANT CHANGE UP
EGFR: 80 ML/MIN/1.73M2 — SIGNIFICANT CHANGE UP
EOSINOPHIL # BLD AUTO: 0 K/UL — SIGNIFICANT CHANGE UP (ref 0–0.5)
EOSINOPHIL NFR BLD AUTO: 0 % — SIGNIFICANT CHANGE UP (ref 0–6)
EPI CELLS # UR: SIGNIFICANT CHANGE UP
ETHANOL SERPL-MCNC: <10 MG/DL — SIGNIFICANT CHANGE UP (ref 0–9)
GAS PNL BLDV: SIGNIFICANT CHANGE UP
GIANT PLATELETS BLD QL SMEAR: PRESENT — SIGNIFICANT CHANGE UP
GLUCOSE SERPL-MCNC: 100 MG/DL — HIGH (ref 70–99)
GLUCOSE SERPL-MCNC: 109 MG/DL — HIGH (ref 70–99)
GLUCOSE SERPL-MCNC: 121 MG/DL — HIGH (ref 70–99)
GLUCOSE SERPL-MCNC: 90 MG/DL — SIGNIFICANT CHANGE UP (ref 70–99)
GLUCOSE SERPL-MCNC: 92 MG/DL — SIGNIFICANT CHANGE UP (ref 70–99)
GLUCOSE UR QL: NEGATIVE MG/DL — SIGNIFICANT CHANGE UP
HCO3 BLDV-SCNC: 32 MMOL/L — HIGH (ref 22–29)
HCT VFR BLD CALC: 39.3 % — SIGNIFICANT CHANGE UP (ref 34.5–45)
HCT VFR BLD CALC: 40.7 % — SIGNIFICANT CHANGE UP (ref 34.5–45)
HCT VFR BLD CALC: 42.6 % — SIGNIFICANT CHANGE UP (ref 34.5–45)
HCT VFR BLD CALC: 42.7 % — SIGNIFICANT CHANGE UP (ref 34.5–45)
HGB BLD-MCNC: 13.1 G/DL — SIGNIFICANT CHANGE UP (ref 11.5–15.5)
HGB BLD-MCNC: 13.3 G/DL — SIGNIFICANT CHANGE UP (ref 11.5–15.5)
HGB BLD-MCNC: 13.9 G/DL — SIGNIFICANT CHANGE UP (ref 11.5–15.5)
HGB BLD-MCNC: 14.2 G/DL — SIGNIFICANT CHANGE UP (ref 11.5–15.5)
HYALINE CASTS # UR AUTO: ABNORMAL /LPF
INR BLD: 1.9 RATIO — HIGH (ref 0.88–1.16)
KETONES UR-MCNC: ABNORMAL
KETONES UR-MCNC: NEGATIVE — SIGNIFICANT CHANGE UP
KETONES UR-MCNC: NEGATIVE — SIGNIFICANT CHANGE UP
LACTATE BLDV-MCNC: 2.1 MMOL/L — HIGH (ref 0.5–2)
LACTATE SERPL-SCNC: 1.5 MMOL/L — SIGNIFICANT CHANGE UP (ref 0.5–2)
LEUKOCYTE ESTERASE UR-ACNC: ABNORMAL
LEUKOCYTE ESTERASE UR-ACNC: NEGATIVE — SIGNIFICANT CHANGE UP
LEUKOCYTE ESTERASE UR-ACNC: NEGATIVE — SIGNIFICANT CHANGE UP
LIDOCAIN IGE QN: 11 U/L — LOW (ref 22–51)
LYMPHOCYTES # BLD AUTO: 0.54 K/UL — LOW (ref 1–3.3)
LYMPHOCYTES # BLD AUTO: 4.4 % — LOW (ref 13–44)
MANUAL SMEAR VERIFICATION: SIGNIFICANT CHANGE UP
MCHC RBC-ENTMCNC: 30.1 PG — SIGNIFICANT CHANGE UP (ref 27–34)
MCHC RBC-ENTMCNC: 30.2 PG — SIGNIFICANT CHANGE UP (ref 27–34)
MCHC RBC-ENTMCNC: 30.3 PG — SIGNIFICANT CHANGE UP (ref 27–34)
MCHC RBC-ENTMCNC: 30.6 PG — SIGNIFICANT CHANGE UP (ref 27–34)
MCHC RBC-ENTMCNC: 32.2 GM/DL — SIGNIFICANT CHANGE UP (ref 32–36)
MCHC RBC-ENTMCNC: 32.6 GM/DL — SIGNIFICANT CHANGE UP (ref 32–36)
MCHC RBC-ENTMCNC: 33.3 GM/DL — SIGNIFICANT CHANGE UP (ref 32–36)
MCHC RBC-ENTMCNC: 33.8 GM/DL — SIGNIFICANT CHANGE UP (ref 32–36)
MCV RBC AUTO: 90.6 FL — SIGNIFICANT CHANGE UP (ref 80–100)
MCV RBC AUTO: 90.9 FL — SIGNIFICANT CHANGE UP (ref 80–100)
MCV RBC AUTO: 92.8 FL — SIGNIFICANT CHANGE UP (ref 80–100)
MCV RBC AUTO: 93.6 FL — SIGNIFICANT CHANGE UP (ref 80–100)
MONOCYTES # BLD AUTO: 0.85 K/UL — SIGNIFICANT CHANGE UP (ref 0–0.9)
MONOCYTES NFR BLD AUTO: 7 % — SIGNIFICANT CHANGE UP (ref 2–14)
MYELOCYTES NFR BLD: 0.9 % — HIGH (ref 0–0)
NEUTROPHILS # BLD AUTO: 10.46 K/UL — HIGH (ref 1.8–7.4)
NEUTROPHILS NFR BLD AUTO: 81.7 % — HIGH (ref 43–77)
NEUTS BAND # BLD: 4.3 % — SIGNIFICANT CHANGE UP (ref 0–8)
NITRITE UR-MCNC: NEGATIVE — SIGNIFICANT CHANGE UP
PCO2 BLDV: 44 MMHG — HIGH (ref 39–42)
PH BLDV: 7.47 — HIGH (ref 7.32–7.43)
PH UR: 5 — SIGNIFICANT CHANGE UP (ref 5–8)
PH UR: 6 — SIGNIFICANT CHANGE UP (ref 5–8)
PH UR: 8 — SIGNIFICANT CHANGE UP (ref 5–8)
PLAT MORPH BLD: NORMAL — SIGNIFICANT CHANGE UP
PLATELET # BLD AUTO: 146 K/UL — LOW (ref 150–400)
PLATELET # BLD AUTO: 183 K/UL — SIGNIFICANT CHANGE UP (ref 150–400)
PLATELET # BLD AUTO: 186 K/UL — SIGNIFICANT CHANGE UP (ref 150–400)
PLATELET # BLD AUTO: 219 K/UL — SIGNIFICANT CHANGE UP (ref 150–400)
PO2 BLDV: <42 MMHG — SIGNIFICANT CHANGE UP (ref 25–45)
POIKILOCYTOSIS BLD QL AUTO: SLIGHT — SIGNIFICANT CHANGE UP
POLYCHROMASIA BLD QL SMEAR: SLIGHT — SIGNIFICANT CHANGE UP
POTASSIUM SERPL-MCNC: 3.9 MMOL/L — SIGNIFICANT CHANGE UP (ref 3.5–5.3)
POTASSIUM SERPL-MCNC: 4.2 MMOL/L — SIGNIFICANT CHANGE UP (ref 3.5–5.3)
POTASSIUM SERPL-MCNC: 4.5 MMOL/L — SIGNIFICANT CHANGE UP (ref 3.5–5.3)
POTASSIUM SERPL-MCNC: 4.5 MMOL/L — SIGNIFICANT CHANGE UP (ref 3.5–5.3)
POTASSIUM SERPL-MCNC: 4.6 MMOL/L — SIGNIFICANT CHANGE UP (ref 3.5–5.3)
POTASSIUM SERPL-SCNC: 3.9 MMOL/L — SIGNIFICANT CHANGE UP (ref 3.5–5.3)
POTASSIUM SERPL-SCNC: 4.2 MMOL/L — SIGNIFICANT CHANGE UP (ref 3.5–5.3)
POTASSIUM SERPL-SCNC: 4.5 MMOL/L — SIGNIFICANT CHANGE UP (ref 3.5–5.3)
POTASSIUM SERPL-SCNC: 4.5 MMOL/L — SIGNIFICANT CHANGE UP (ref 3.5–5.3)
POTASSIUM SERPL-SCNC: 4.6 MMOL/L — SIGNIFICANT CHANGE UP (ref 3.5–5.3)
PROMYELOCYTES # FLD: 1.7 % — HIGH (ref 0–0)
PROT SERPL-MCNC: 5.9 G/DL — LOW (ref 6.6–8.7)
PROT SERPL-MCNC: 6.3 G/DL — LOW (ref 6.6–8.7)
PROT UR-MCNC: 15
PROT UR-MCNC: 30 MG/DL
PROT UR-MCNC: NEGATIVE — SIGNIFICANT CHANGE UP
PROTHROM AB SERPL-ACNC: 22.2 SEC — HIGH (ref 10.5–13.4)
RAPID RVP RESULT: SIGNIFICANT CHANGE UP
RBC # BLD: 4.34 M/UL — SIGNIFICANT CHANGE UP (ref 3.8–5.2)
RBC # BLD: 4.35 M/UL — SIGNIFICANT CHANGE UP (ref 3.8–5.2)
RBC # BLD: 4.59 M/UL — SIGNIFICANT CHANGE UP (ref 3.8–5.2)
RBC # BLD: 4.7 M/UL — SIGNIFICANT CHANGE UP (ref 3.8–5.2)
RBC # FLD: 13.7 % — SIGNIFICANT CHANGE UP (ref 10.3–14.5)
RBC # FLD: 14 % — SIGNIFICANT CHANGE UP (ref 10.3–14.5)
RBC # FLD: 14.2 % — SIGNIFICANT CHANGE UP (ref 10.3–14.5)
RBC # FLD: 14.3 % — SIGNIFICANT CHANGE UP (ref 10.3–14.5)
RBC BLD AUTO: ABNORMAL
RBC CASTS # UR COMP ASSIST: ABNORMAL /HPF (ref 0–4)
SAO2 % BLDV: 55.3 % — SIGNIFICANT CHANGE UP
SARS-COV-2 RNA SPEC QL NAA+PROBE: SIGNIFICANT CHANGE UP
SODIUM SERPL-SCNC: 136 MMOL/L — SIGNIFICANT CHANGE UP (ref 135–145)
SODIUM SERPL-SCNC: 137 MMOL/L — SIGNIFICANT CHANGE UP (ref 135–145)
SODIUM SERPL-SCNC: 139 MMOL/L — SIGNIFICANT CHANGE UP (ref 135–145)
SODIUM SERPL-SCNC: 142 MMOL/L — SIGNIFICANT CHANGE UP (ref 135–145)
SODIUM SERPL-SCNC: 142 MMOL/L — SIGNIFICANT CHANGE UP (ref 135–145)
SP GR SPEC: 1.01 — SIGNIFICANT CHANGE UP (ref 1.01–1.02)
SP GR SPEC: 1.02 — SIGNIFICANT CHANGE UP (ref 1.01–1.02)
SP GR SPEC: 1.02 — SIGNIFICANT CHANGE UP (ref 1.01–1.02)
SPECIMEN SOURCE: SIGNIFICANT CHANGE UP
SPECIMEN SOURCE: SIGNIFICANT CHANGE UP
TROPONIN T SERPL-MCNC: 0.02 NG/ML — SIGNIFICANT CHANGE UP (ref 0–0.06)
TROPONIN T SERPL-MCNC: 0.02 NG/ML — SIGNIFICANT CHANGE UP (ref 0–0.06)
UROBILINOGEN FLD QL: 1 MG/DL
UROBILINOGEN FLD QL: NEGATIVE MG/DL — SIGNIFICANT CHANGE UP
UROBILINOGEN FLD QL: NEGATIVE MG/DL — SIGNIFICANT CHANGE UP
WBC # BLD: 10.76 K/UL — HIGH (ref 3.8–10.5)
WBC # BLD: 12.16 K/UL — HIGH (ref 3.8–10.5)
WBC # BLD: 9.19 K/UL — SIGNIFICANT CHANGE UP (ref 3.8–10.5)
WBC # BLD: 9.24 K/UL — SIGNIFICANT CHANGE UP (ref 3.8–10.5)
WBC # FLD AUTO: 10.76 K/UL — HIGH (ref 3.8–10.5)
WBC # FLD AUTO: 12.16 K/UL — HIGH (ref 3.8–10.5)
WBC # FLD AUTO: 9.19 K/UL — SIGNIFICANT CHANGE UP (ref 3.8–10.5)
WBC # FLD AUTO: 9.24 K/UL — SIGNIFICANT CHANGE UP (ref 3.8–10.5)
WBC UR QL: SIGNIFICANT CHANGE UP /HPF (ref 0–5)

## 2022-01-01 PROCEDURE — 84484 ASSAY OF TROPONIN QUANT: CPT

## 2022-01-01 PROCEDURE — 99213 OFFICE O/P EST LOW 20 MIN: CPT | Mod: 25

## 2022-01-01 PROCEDURE — 93005 ELECTROCARDIOGRAM TRACING: CPT

## 2022-01-01 PROCEDURE — 86850 RBC ANTIBODY SCREEN: CPT

## 2022-01-01 PROCEDURE — 85025 COMPLETE CBC W/AUTO DIFF WBC: CPT

## 2022-01-01 PROCEDURE — 99233 SBSQ HOSP IP/OBS HIGH 50: CPT

## 2022-01-01 PROCEDURE — 85610 PROTHROMBIN TIME: CPT

## 2022-01-01 PROCEDURE — 70450 CT HEAD/BRAIN W/O DYE: CPT | Mod: MA

## 2022-01-01 PROCEDURE — 0225U NFCT DS DNA&RNA 21 SARSCOV2: CPT

## 2022-01-01 PROCEDURE — 81001 URINALYSIS AUTO W/SCOPE: CPT

## 2022-01-01 PROCEDURE — 80053 COMPREHEN METABOLIC PANEL: CPT

## 2022-01-01 PROCEDURE — 81003 URINALYSIS AUTO W/O SCOPE: CPT

## 2022-01-01 PROCEDURE — 73070 X-RAY EXAM OF ELBOW: CPT | Mod: 26,RT

## 2022-01-01 PROCEDURE — 71045 X-RAY EXAM CHEST 1 VIEW: CPT

## 2022-01-01 PROCEDURE — 99232 SBSQ HOSP IP/OBS MODERATE 35: CPT

## 2022-01-01 PROCEDURE — 83690 ASSAY OF LIPASE: CPT

## 2022-01-01 PROCEDURE — 99285 EMERGENCY DEPT VISIT HI MDM: CPT | Mod: 25

## 2022-01-01 PROCEDURE — U0005: CPT

## 2022-01-01 PROCEDURE — 84100 ASSAY OF PHOSPHORUS: CPT

## 2022-01-01 PROCEDURE — 72125 CT NECK SPINE W/O DYE: CPT | Mod: MA

## 2022-01-01 PROCEDURE — 85730 THROMBOPLASTIN TIME PARTIAL: CPT

## 2022-01-01 PROCEDURE — 83605 ASSAY OF LACTIC ACID: CPT

## 2022-01-01 PROCEDURE — 86901 BLOOD TYPING SEROLOGIC RH(D): CPT

## 2022-01-01 PROCEDURE — 83735 ASSAY OF MAGNESIUM: CPT

## 2022-01-01 PROCEDURE — 73560 X-RAY EXAM OF KNEE 1 OR 2: CPT | Mod: 26,LT

## 2022-01-01 PROCEDURE — 17281 DSTR MAL LS F/E/E/N/L/M .6-1: CPT

## 2022-01-01 PROCEDURE — 97116 GAIT TRAINING THERAPY: CPT

## 2022-01-01 PROCEDURE — 72125 CT NECK SPINE W/O DYE: CPT | Mod: 26,MA

## 2022-01-01 PROCEDURE — 70486 CT MAXILLOFACIAL W/O DYE: CPT | Mod: MA

## 2022-01-01 PROCEDURE — C8929: CPT

## 2022-01-01 PROCEDURE — 87040 BLOOD CULTURE FOR BACTERIA: CPT

## 2022-01-01 PROCEDURE — 80048 BASIC METABOLIC PNL TOTAL CA: CPT

## 2022-01-01 PROCEDURE — 70486 CT MAXILLOFACIAL W/O DYE: CPT | Mod: 26,MA

## 2022-01-01 PROCEDURE — 83880 ASSAY OF NATRIURETIC PEPTIDE: CPT

## 2022-01-01 PROCEDURE — 99285 EMERGENCY DEPT VISIT HI MDM: CPT

## 2022-01-01 PROCEDURE — 80307 DRUG TEST PRSMV CHEM ANLYZR: CPT

## 2022-01-01 PROCEDURE — 70450 CT HEAD/BRAIN W/O DYE: CPT | Mod: 26,MA

## 2022-01-01 PROCEDURE — 97530 THERAPEUTIC ACTIVITIES: CPT

## 2022-01-01 PROCEDURE — 96374 THER/PROPH/DIAG INJ IV PUSH: CPT

## 2022-01-01 PROCEDURE — 71045 X-RAY EXAM CHEST 1 VIEW: CPT | Mod: 26

## 2022-01-01 PROCEDURE — 99223 1ST HOSP IP/OBS HIGH 75: CPT

## 2022-01-01 PROCEDURE — 73070 X-RAY EXAM OF ELBOW: CPT

## 2022-01-01 PROCEDURE — 36415 COLL VENOUS BLD VENIPUNCTURE: CPT

## 2022-01-01 PROCEDURE — 82803 BLOOD GASES ANY COMBINATION: CPT

## 2022-01-01 PROCEDURE — 73560 X-RAY EXAM OF KNEE 1 OR 2: CPT

## 2022-01-01 PROCEDURE — 86900 BLOOD TYPING SEROLOGIC ABO: CPT

## 2022-01-01 PROCEDURE — 84145 PROCALCITONIN (PCT): CPT

## 2022-01-01 PROCEDURE — 93010 ELECTROCARDIOGRAM REPORT: CPT

## 2022-01-01 PROCEDURE — 82550 ASSAY OF CK (CPK): CPT

## 2022-01-01 PROCEDURE — 82553 CREATINE MB FRACTION: CPT

## 2022-01-01 PROCEDURE — 97163 PT EVAL HIGH COMPLEX 45 MIN: CPT

## 2022-01-01 PROCEDURE — 99239 HOSP IP/OBS DSCHRG MGMT >30: CPT

## 2022-01-01 PROCEDURE — 85027 COMPLETE CBC AUTOMATED: CPT

## 2022-01-01 PROCEDURE — U0003: CPT

## 2022-01-01 PROCEDURE — 93306 TTE W/DOPPLER COMPLETE: CPT | Mod: 26

## 2022-01-01 RX ORDER — ATORVASTATIN CALCIUM 80 MG/1
10 TABLET, FILM COATED ORAL AT BEDTIME
Refills: 0 | Status: DISCONTINUED | OUTPATIENT
Start: 2022-01-01 | End: 2022-01-01

## 2022-01-01 RX ORDER — LACTULOSE 10 G/15ML
10 SOLUTION ORAL ONCE
Refills: 0 | Status: COMPLETED | OUTPATIENT
Start: 2022-01-01 | End: 2022-01-01

## 2022-01-01 RX ORDER — TAMSULOSIN HYDROCHLORIDE 0.4 MG/1
1 CAPSULE ORAL
Qty: 0 | Refills: 0 | DISCHARGE
Start: 2022-01-01

## 2022-01-01 RX ORDER — LANOLIN ALCOHOL/MO/W.PET/CERES
3 CREAM (GRAM) TOPICAL AT BEDTIME
Refills: 0 | Status: DISCONTINUED | OUTPATIENT
Start: 2022-01-01 | End: 2022-01-01

## 2022-01-01 RX ORDER — ACETAMINOPHEN 500 MG
650 TABLET ORAL EVERY 6 HOURS
Refills: 0 | Status: DISCONTINUED | OUTPATIENT
Start: 2022-01-01 | End: 2022-01-01

## 2022-01-01 RX ORDER — SENNA PLUS 8.6 MG/1
2 TABLET ORAL AT BEDTIME
Refills: 0 | Status: DISCONTINUED | OUTPATIENT
Start: 2022-01-01 | End: 2022-01-01

## 2022-01-01 RX ORDER — NYSTATIN CREAM 100000 [USP'U]/G
1 CREAM TOPICAL
Refills: 0 | Status: DISCONTINUED | OUTPATIENT
Start: 2022-01-01 | End: 2022-01-01

## 2022-01-01 RX ORDER — SODIUM CHLORIDE 9 MG/ML
250 INJECTION INTRAMUSCULAR; INTRAVENOUS; SUBCUTANEOUS ONCE
Refills: 0 | Status: COMPLETED | OUTPATIENT
Start: 2022-01-01 | End: 2022-01-01

## 2022-01-01 RX ORDER — DILTIAZEM HCL 120 MG
30 CAPSULE, EXT RELEASE 24 HR ORAL ONCE
Refills: 0 | Status: COMPLETED | OUTPATIENT
Start: 2022-01-01 | End: 2022-01-01

## 2022-01-01 RX ORDER — TAMSULOSIN HYDROCHLORIDE 0.4 MG/1
0.4 CAPSULE ORAL AT BEDTIME
Refills: 0 | Status: DISCONTINUED | OUTPATIENT
Start: 2022-01-01 | End: 2022-01-01

## 2022-01-01 RX ORDER — ATORVASTATIN CALCIUM 80 MG/1
1 TABLET, FILM COATED ORAL
Qty: 0 | Refills: 0 | DISCHARGE

## 2022-01-01 RX ORDER — HALOPERIDOL DECANOATE 100 MG/ML
2 INJECTION INTRAMUSCULAR ONCE
Refills: 0 | Status: COMPLETED | OUTPATIENT
Start: 2022-01-01 | End: 2022-01-01

## 2022-01-01 RX ORDER — SENNA PLUS 8.6 MG/1
2 TABLET ORAL
Qty: 0 | Refills: 0 | DISCHARGE
Start: 2022-01-01

## 2022-01-01 RX ORDER — DILTIAZEM HCL 120 MG
60 CAPSULE, EXT RELEASE 24 HR ORAL EVERY 6 HOURS
Refills: 0 | Status: DISCONTINUED | OUTPATIENT
Start: 2022-01-01 | End: 2022-01-01

## 2022-01-01 RX ORDER — LISINOPRIL 2.5 MG/1
1 TABLET ORAL
Qty: 0 | Refills: 0 | DISCHARGE

## 2022-01-01 RX ORDER — DILTIAZEM HCL 120 MG
10 CAPSULE, EXT RELEASE 24 HR ORAL ONCE
Refills: 0 | Status: COMPLETED | OUTPATIENT
Start: 2022-01-01 | End: 2022-01-01

## 2022-01-01 RX ORDER — BACITRACIN ZINC 500 UNIT/G
1 OINTMENT IN PACKET (EA) TOPICAL DAILY
Refills: 0 | Status: DISCONTINUED | OUTPATIENT
Start: 2022-01-01 | End: 2022-01-01

## 2022-01-01 RX ORDER — LISINOPRIL 2.5 MG/1
10 TABLET ORAL DAILY
Refills: 0 | Status: DISCONTINUED | OUTPATIENT
Start: 2022-01-01 | End: 2022-01-01

## 2022-01-01 RX ORDER — NYSTATIN CREAM 100000 [USP'U]/G
1 CREAM TOPICAL
Qty: 0 | Refills: 0 | DISCHARGE
Start: 2022-01-01

## 2022-01-01 RX ORDER — SODIUM CHLORIDE 9 MG/ML
500 INJECTION INTRAMUSCULAR; INTRAVENOUS; SUBCUTANEOUS ONCE
Refills: 0 | Status: COMPLETED | OUTPATIENT
Start: 2022-01-01 | End: 2022-01-01

## 2022-01-01 RX ORDER — ONDANSETRON 8 MG/1
4 TABLET, FILM COATED ORAL EVERY 8 HOURS
Refills: 0 | Status: DISCONTINUED | OUTPATIENT
Start: 2022-01-01 | End: 2022-01-01

## 2022-01-01 RX ORDER — WARFARIN SODIUM 2.5 MG/1
1 TABLET ORAL
Qty: 0 | Refills: 0 | DISCHARGE

## 2022-01-01 RX ADMIN — ATORVASTATIN CALCIUM 10 MILLIGRAM(S): 80 TABLET, FILM COATED ORAL at 21:10

## 2022-01-01 RX ADMIN — ATORVASTATIN CALCIUM 10 MILLIGRAM(S): 80 TABLET, FILM COATED ORAL at 21:03

## 2022-01-01 RX ADMIN — Medication 60 MILLIGRAM(S): at 13:35

## 2022-01-01 RX ADMIN — Medication 60 MILLIGRAM(S): at 05:32

## 2022-01-01 RX ADMIN — Medication 10 MILLIGRAM(S): at 16:01

## 2022-01-01 RX ADMIN — Medication 60 MILLIGRAM(S): at 23:13

## 2022-01-01 RX ADMIN — Medication 60 MILLIGRAM(S): at 12:58

## 2022-01-01 RX ADMIN — Medication 1 TABLET(S): at 18:18

## 2022-01-01 RX ADMIN — LISINOPRIL 10 MILLIGRAM(S): 2.5 TABLET ORAL at 21:42

## 2022-01-01 RX ADMIN — Medication 650 MILLIGRAM(S): at 16:20

## 2022-01-01 RX ADMIN — Medication 60 MILLIGRAM(S): at 05:16

## 2022-01-01 RX ADMIN — ATORVASTATIN CALCIUM 10 MILLIGRAM(S): 80 TABLET, FILM COATED ORAL at 21:42

## 2022-01-01 RX ADMIN — Medication 60 MILLIGRAM(S): at 12:35

## 2022-01-01 RX ADMIN — LISINOPRIL 10 MILLIGRAM(S): 2.5 TABLET ORAL at 06:32

## 2022-01-01 RX ADMIN — Medication 60 MILLIGRAM(S): at 17:08

## 2022-01-01 RX ADMIN — Medication 650 MILLIGRAM(S): at 06:43

## 2022-01-01 RX ADMIN — Medication 60 MILLIGRAM(S): at 00:39

## 2022-01-01 RX ADMIN — Medication 60 MILLIGRAM(S): at 23:22

## 2022-01-01 RX ADMIN — Medication 60 MILLIGRAM(S): at 06:16

## 2022-01-01 RX ADMIN — LISINOPRIL 10 MILLIGRAM(S): 2.5 TABLET ORAL at 06:15

## 2022-01-01 RX ADMIN — Medication 60 MILLIGRAM(S): at 05:33

## 2022-01-01 RX ADMIN — NYSTATIN CREAM 1 APPLICATION(S): 100000 CREAM TOPICAL at 06:43

## 2022-01-01 RX ADMIN — Medication 60 MILLIGRAM(S): at 18:13

## 2022-01-01 RX ADMIN — NYSTATIN CREAM 1 APPLICATION(S): 100000 CREAM TOPICAL at 17:02

## 2022-01-01 RX ADMIN — Medication 60 MILLIGRAM(S): at 11:30

## 2022-01-01 RX ADMIN — TAMSULOSIN HYDROCHLORIDE 0.4 MILLIGRAM(S): 0.4 CAPSULE ORAL at 21:10

## 2022-01-01 RX ADMIN — Medication 60 MILLIGRAM(S): at 11:21

## 2022-01-01 RX ADMIN — TAMSULOSIN HYDROCHLORIDE 0.4 MILLIGRAM(S): 0.4 CAPSULE ORAL at 23:08

## 2022-01-01 RX ADMIN — SENNA PLUS 2 TABLET(S): 8.6 TABLET ORAL at 23:09

## 2022-01-01 RX ADMIN — SODIUM CHLORIDE 500 MILLILITER(S): 9 INJECTION INTRAMUSCULAR; INTRAVENOUS; SUBCUTANEOUS at 16:01

## 2022-01-01 RX ADMIN — Medication 60 MILLIGRAM(S): at 17:41

## 2022-01-01 RX ADMIN — ATORVASTATIN CALCIUM 10 MILLIGRAM(S): 80 TABLET, FILM COATED ORAL at 23:12

## 2022-01-01 RX ADMIN — Medication 60 MILLIGRAM(S): at 23:03

## 2022-01-01 RX ADMIN — Medication 60 MILLIGRAM(S): at 17:44

## 2022-01-01 RX ADMIN — SENNA PLUS 2 TABLET(S): 8.6 TABLET ORAL at 21:54

## 2022-01-01 RX ADMIN — Medication 60 MILLIGRAM(S): at 22:00

## 2022-01-01 RX ADMIN — Medication 60 MILLIGRAM(S): at 06:38

## 2022-01-01 RX ADMIN — Medication 650 MILLIGRAM(S): at 15:20

## 2022-01-01 RX ADMIN — LISINOPRIL 10 MILLIGRAM(S): 2.5 TABLET ORAL at 06:44

## 2022-01-01 RX ADMIN — SENNA PLUS 2 TABLET(S): 8.6 TABLET ORAL at 22:06

## 2022-01-01 RX ADMIN — Medication 60 MILLIGRAM(S): at 17:49

## 2022-01-01 RX ADMIN — HALOPERIDOL DECANOATE 2 MILLIGRAM(S): 100 INJECTION INTRAMUSCULAR at 01:00

## 2022-01-01 RX ADMIN — ATORVASTATIN CALCIUM 10 MILLIGRAM(S): 80 TABLET, FILM COATED ORAL at 22:08

## 2022-01-01 RX ADMIN — LISINOPRIL 10 MILLIGRAM(S): 2.5 TABLET ORAL at 06:38

## 2022-01-01 RX ADMIN — NYSTATIN CREAM 1 APPLICATION(S): 100000 CREAM TOPICAL at 17:45

## 2022-01-01 RX ADMIN — Medication 60 MILLIGRAM(S): at 17:02

## 2022-01-01 RX ADMIN — Medication 60 MILLIGRAM(S): at 06:44

## 2022-01-01 RX ADMIN — Medication 650 MILLIGRAM(S): at 07:13

## 2022-01-01 RX ADMIN — ATORVASTATIN CALCIUM 10 MILLIGRAM(S): 80 TABLET, FILM COATED ORAL at 21:53

## 2022-01-01 RX ADMIN — ATORVASTATIN CALCIUM 10 MILLIGRAM(S): 80 TABLET, FILM COATED ORAL at 23:06

## 2022-01-01 RX ADMIN — SENNA PLUS 2 TABLET(S): 8.6 TABLET ORAL at 23:05

## 2022-01-01 RX ADMIN — Medication 60 MILLIGRAM(S): at 23:10

## 2022-01-01 RX ADMIN — ATORVASTATIN CALCIUM 10 MILLIGRAM(S): 80 TABLET, FILM COATED ORAL at 23:11

## 2022-01-01 RX ADMIN — Medication 30 MILLIGRAM(S): at 16:02

## 2022-01-01 RX ADMIN — Medication 60 MILLIGRAM(S): at 00:19

## 2022-01-01 RX ADMIN — Medication 60 MILLIGRAM(S): at 06:31

## 2022-01-01 RX ADMIN — Medication 3 MILLIGRAM(S): at 23:06

## 2022-01-01 RX ADMIN — SENNA PLUS 2 TABLET(S): 8.6 TABLET ORAL at 21:03

## 2022-01-01 RX ADMIN — LISINOPRIL 10 MILLIGRAM(S): 2.5 TABLET ORAL at 05:16

## 2022-01-01 RX ADMIN — Medication 60 MILLIGRAM(S): at 17:43

## 2022-01-01 RX ADMIN — LACTULOSE 10 GRAM(S): 10 SOLUTION ORAL at 17:41

## 2022-01-01 RX ADMIN — SODIUM CHLORIDE 250 MILLILITER(S): 9 INJECTION INTRAMUSCULAR; INTRAVENOUS; SUBCUTANEOUS at 13:39

## 2022-01-01 RX ADMIN — NYSTATIN CREAM 1 APPLICATION(S): 100000 CREAM TOPICAL at 06:44

## 2022-01-01 RX ADMIN — Medication 60 MILLIGRAM(S): at 11:44

## 2022-01-01 RX ADMIN — TAMSULOSIN HYDROCHLORIDE 0.4 MILLIGRAM(S): 0.4 CAPSULE ORAL at 22:07

## 2022-01-01 RX ADMIN — Medication 60 MILLIGRAM(S): at 23:17

## 2022-01-01 RX ADMIN — Medication 60 MILLIGRAM(S): at 07:04

## 2022-01-01 RX ADMIN — LISINOPRIL 10 MILLIGRAM(S): 2.5 TABLET ORAL at 05:32

## 2022-01-01 RX ADMIN — LISINOPRIL 10 MILLIGRAM(S): 2.5 TABLET ORAL at 05:34

## 2022-01-01 RX ADMIN — NYSTATIN CREAM 1 APPLICATION(S): 100000 CREAM TOPICAL at 18:18

## 2022-01-01 RX ADMIN — LISINOPRIL 10 MILLIGRAM(S): 2.5 TABLET ORAL at 07:04

## 2022-05-05 ENCOUNTER — EMERGENCY (EMERGENCY)
Facility: HOSPITAL | Age: 87
LOS: 1 days | Discharge: HOME | End: 2022-05-05
Attending: EMERGENCY MEDICINE
Payer: MEDICARE

## 2022-05-05 VITALS
RESPIRATION RATE: 18 BRPM | SYSTOLIC BLOOD PRESSURE: 106 MMHG | HEART RATE: 86 BPM | HEIGHT: 64 IN | WEIGHT: 143.08 LBS | TEMPERATURE: 99 F | DIASTOLIC BLOOD PRESSURE: 66 MMHG | OXYGEN SATURATION: 99 %

## 2022-05-05 VITALS
HEART RATE: 84 BPM | RESPIRATION RATE: 18 BRPM | SYSTOLIC BLOOD PRESSURE: 150 MMHG | TEMPERATURE: 99 F | DIASTOLIC BLOOD PRESSURE: 78 MMHG | OXYGEN SATURATION: 98 %

## 2022-05-05 LAB
ALBUMIN SERPL ELPH-MCNC: 3.7 G/DL — SIGNIFICANT CHANGE UP (ref 3.3–5.2)
ALP SERPL-CCNC: 147 U/L — HIGH (ref 40–120)
ALT FLD-CCNC: 21 U/L — SIGNIFICANT CHANGE UP
ANION GAP SERPL CALC-SCNC: 10 MMOL/L — SIGNIFICANT CHANGE UP (ref 5–17)
APPEARANCE UR: CLEAR — SIGNIFICANT CHANGE UP
AST SERPL-CCNC: 19 U/L — SIGNIFICANT CHANGE UP
BACTERIA # UR AUTO: ABNORMAL
BASOPHILS # BLD AUTO: 0.03 K/UL — SIGNIFICANT CHANGE UP (ref 0–0.2)
BASOPHILS NFR BLD AUTO: 0.4 % — SIGNIFICANT CHANGE UP (ref 0–2)
BILIRUB SERPL-MCNC: 0.4 MG/DL — SIGNIFICANT CHANGE UP (ref 0.4–2)
BILIRUB UR-MCNC: NEGATIVE — SIGNIFICANT CHANGE UP
BUN SERPL-MCNC: 32.4 MG/DL — HIGH (ref 8–20)
CALCIUM SERPL-MCNC: 9.2 MG/DL — SIGNIFICANT CHANGE UP (ref 8.6–10.2)
CHLORIDE SERPL-SCNC: 106 MMOL/L — SIGNIFICANT CHANGE UP (ref 98–107)
CO2 SERPL-SCNC: 25 MMOL/L — SIGNIFICANT CHANGE UP (ref 22–29)
COLOR SPEC: YELLOW — SIGNIFICANT CHANGE UP
CREAT SERPL-MCNC: 1.08 MG/DL — SIGNIFICANT CHANGE UP (ref 0.5–1.3)
DIFF PNL FLD: ABNORMAL
EGFR: 48 ML/MIN/1.73M2 — LOW
EOSINOPHIL # BLD AUTO: 0.02 K/UL — SIGNIFICANT CHANGE UP (ref 0–0.5)
EOSINOPHIL NFR BLD AUTO: 0.3 % — SIGNIFICANT CHANGE UP (ref 0–6)
EPI CELLS # UR: ABNORMAL
GLUCOSE SERPL-MCNC: 124 MG/DL — HIGH (ref 70–99)
GLUCOSE UR QL: NEGATIVE MG/DL — SIGNIFICANT CHANGE UP
HCT VFR BLD CALC: 39.8 % — SIGNIFICANT CHANGE UP (ref 34.5–45)
HGB BLD-MCNC: 12.7 G/DL — SIGNIFICANT CHANGE UP (ref 11.5–15.5)
IMM GRANULOCYTES NFR BLD AUTO: 0.3 % — SIGNIFICANT CHANGE UP (ref 0–1.5)
KETONES UR-MCNC: ABNORMAL
LEUKOCYTE ESTERASE UR-ACNC: ABNORMAL
LYMPHOCYTES # BLD AUTO: 0.59 K/UL — LOW (ref 1–3.3)
LYMPHOCYTES # BLD AUTO: 8.3 % — LOW (ref 13–44)
MCHC RBC-ENTMCNC: 30.3 PG — SIGNIFICANT CHANGE UP (ref 27–34)
MCHC RBC-ENTMCNC: 31.9 GM/DL — LOW (ref 32–36)
MCV RBC AUTO: 95 FL — SIGNIFICANT CHANGE UP (ref 80–100)
MONOCYTES # BLD AUTO: 0.54 K/UL — SIGNIFICANT CHANGE UP (ref 0–0.9)
MONOCYTES NFR BLD AUTO: 7.6 % — SIGNIFICANT CHANGE UP (ref 2–14)
NEUTROPHILS # BLD AUTO: 5.94 K/UL — SIGNIFICANT CHANGE UP (ref 1.8–7.4)
NEUTROPHILS NFR BLD AUTO: 83.1 % — HIGH (ref 43–77)
NITRITE UR-MCNC: NEGATIVE — SIGNIFICANT CHANGE UP
PH UR: 5 — SIGNIFICANT CHANGE UP (ref 5–8)
PLATELET # BLD AUTO: 218 K/UL — SIGNIFICANT CHANGE UP (ref 150–400)
POTASSIUM SERPL-MCNC: 4.9 MMOL/L — SIGNIFICANT CHANGE UP (ref 3.5–5.3)
POTASSIUM SERPL-SCNC: 4.9 MMOL/L — SIGNIFICANT CHANGE UP (ref 3.5–5.3)
PROT SERPL-MCNC: 6.6 G/DL — SIGNIFICANT CHANGE UP (ref 6.6–8.7)
PROT UR-MCNC: NEGATIVE — SIGNIFICANT CHANGE UP
RBC # BLD: 4.19 M/UL — SIGNIFICANT CHANGE UP (ref 3.8–5.2)
RBC # FLD: 14.6 % — HIGH (ref 10.3–14.5)
RBC CASTS # UR COMP ASSIST: SIGNIFICANT CHANGE UP /HPF (ref 0–4)
SODIUM SERPL-SCNC: 141 MMOL/L — SIGNIFICANT CHANGE UP (ref 135–145)
SP GR SPEC: 1.02 — SIGNIFICANT CHANGE UP (ref 1.01–1.02)
UROBILINOGEN FLD QL: NEGATIVE MG/DL — SIGNIFICANT CHANGE UP
WBC # BLD: 7.14 K/UL — SIGNIFICANT CHANGE UP (ref 3.8–10.5)
WBC # FLD AUTO: 7.14 K/UL — SIGNIFICANT CHANGE UP (ref 3.8–10.5)
WBC UR QL: ABNORMAL /HPF (ref 0–5)

## 2022-05-05 PROCEDURE — 99285 EMERGENCY DEPT VISIT HI MDM: CPT

## 2022-05-05 PROCEDURE — 99285 EMERGENCY DEPT VISIT HI MDM: CPT | Mod: 25

## 2022-05-05 PROCEDURE — 93005 ELECTROCARDIOGRAM TRACING: CPT

## 2022-05-05 PROCEDURE — 70450 CT HEAD/BRAIN W/O DYE: CPT | Mod: 26,MA

## 2022-05-05 PROCEDURE — 85025 COMPLETE CBC W/AUTO DIFF WBC: CPT

## 2022-05-05 PROCEDURE — 36415 COLL VENOUS BLD VENIPUNCTURE: CPT

## 2022-05-05 PROCEDURE — 87077 CULTURE AEROBIC IDENTIFY: CPT

## 2022-05-05 PROCEDURE — 87086 URINE CULTURE/COLONY COUNT: CPT

## 2022-05-05 PROCEDURE — 87186 SC STD MICRODIL/AGAR DIL: CPT

## 2022-05-05 PROCEDURE — 70450 CT HEAD/BRAIN W/O DYE: CPT | Mod: MA

## 2022-05-05 PROCEDURE — 93010 ELECTROCARDIOGRAM REPORT: CPT

## 2022-05-05 PROCEDURE — 80053 COMPREHEN METABOLIC PANEL: CPT

## 2022-05-05 PROCEDURE — 81001 URINALYSIS AUTO W/SCOPE: CPT

## 2022-05-05 RX ORDER — CEPHALEXIN 500 MG
1 CAPSULE ORAL
Qty: 14 | Refills: 0
Start: 2022-05-05 | End: 2022-05-11

## 2022-05-05 RX ORDER — IBUPROFEN 200 MG
600 TABLET ORAL ONCE
Refills: 0 | Status: DISCONTINUED | OUTPATIENT
Start: 2022-05-05 | End: 2022-05-05

## 2022-05-05 RX ORDER — ACETAMINOPHEN 500 MG
650 TABLET ORAL ONCE
Refills: 0 | Status: COMPLETED | OUTPATIENT
Start: 2022-05-05 | End: 2022-05-05

## 2022-05-05 RX ADMIN — Medication 650 MILLIGRAM(S): at 13:45

## 2022-05-05 NOTE — ED PROVIDER NOTE - PATIENT PORTAL LINK FT
You can access the FollowMyHealth Patient Portal offered by [facility] by registering at the following website: [email or web address]. By joining [facility]’s FollowMyHealth portal, you will also be able to view your health information using other applications (apps) compatible with our system.

## 2022-05-05 NOTE — ED ADULT NURSE NOTE - NS ED NURSE RECORD ANOTHER VITAL SIGN
Patient is being referred by Dr. 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 Pacheco, per referral request.  The referral has been sent to scanning for inclusion in the patient's chart.      A message was left for patient/family requesting a call back to schedule an appointment.  The clinic phone number was provided.      [email or web address]         Yes

## 2022-05-05 NOTE — ED ADULT TRIAGE NOTE - CHIEF COMPLAINT QUOTE
Pt brought in by ambulance from home for eval of worsening b/l lower ext weakness for the past few weeks. Pt states that today she was unable to walk back from the car without assistance. Pt denies injury or trauma. States hasn't been taking her meds for Parkinson's in "a long time".

## 2022-05-05 NOTE — ED PROVIDER NOTE - CLINICAL SUMMARY MEDICAL DECISION MAKING FREE TEXT BOX
ASSESSMENT:   ANG MCCAIN is a 89yo F who presented with fall from standing after morning of running errands in setting of parkinsons disease. EKG w/o acute change. Physical exam w/o significant findings. Pt on warfarin.    Fall likely secondary to active morning & parkinson's but will screen for other potential stressors including infection, kidney/liver dysfunction, electrolyte derangements, anemia. Will r/o brain bleed given warfarin.       Medications:  acetaminophen     Tablet ..    Studies:  12 Lead ECG  Culture - Urine  Urinalysis + Microscopic Examination  CT Head No Cont  Comprehensive Metabolic Panel  Complete Blood Count + Automated Diff

## 2022-05-05 NOTE — ED PROVIDER NOTE - ATTENDING CONTRIBUTION TO CARE
Rebecca: I performed a face to face evaluation of this patient and performed a full history and physical examination on the patient.  I agree with the resident's history, physical examination, and plan of the patient unless otherwise noted. My brief assessment is as follows: hx parkinsons, afib on a/c present after feeling like her legs gave out. was out shopping. states fel/lowered self to knees. denies hitting head, no loc, no dizziness. no cp/sob/abd pain. lives alone, uses cane, feels comfortable at home and wants to go home. no f/c, no other complaints. non toxic, well appearing, ctab, irregular but nl rate, abd benign, ncat, no midline neck/spine ttp, neuro intact, no rigidity/tremor. well appearing without sign trauma, will check labs, urine, cth, pt with steady gait here and would like to go home/feels safe and takes care of adl's with support system surrounding her. reassess

## 2022-05-05 NOTE — ED PROVIDER NOTE - OBJECTIVE STATEMENT
REZA MCCAIN is a 89yo F with PMH Parkinson's, HTN, HLD, afib on Eliquis who was BIBEMS c/o fall from standing. States she was out shopping, went to three stores and her legs started feeling weak in the grocery store so she decided to to go home. Drove home and on the way walking into her house her "legs gave out" and she fell to her knees. She denies hitting her head and denies LOC. Her neighbor was nearby and helped her to a bench where she waited for EMS. Pt states she is no longer in pain at all. She denies any recent illness, fevers, chills, n/v/d, chest pain, sob.

## 2022-05-05 NOTE — ED PROVIDER NOTE - NSFOLLOWUPINSTRUCTIONS_ED_ALL_ED_FT
(1) Take the antibiotics as prescribed.   (2) See a neurologist for further evaluation and treatment of your Parkinson's.     Urinary Tract Infection    A urinary tract infection (UTI) is an infection of any part of the urinary tract, which includes the kidneys, ureters, bladder, and urethra. Risk factors include ignoring your need to urinate, wiping back to front if female, being an uncircumcised male, and having diabetes or a weak immune system. Symptoms include frequent urination, pain or burning with urination, foul smelling urine, cloudy urine, pain in the lower abdomen, blood in the urine, and fever. If you were prescribed an antibiotic medicine, take it as told by your health care provider. Do not stop taking the antibiotic even if you start to feel better.    SEEK IMMEDIATE MEDICAL CARE IF YOU HAVE ANY OF THE FOLLOWING SYMPTOMS: severe back or abdominal pain, fever, inability to keep fluids or medicine down, dizziness/lightheadedness, or a change in mental status.

## 2022-05-05 NOTE — ED PROVIDER NOTE - NS ED ROS FT
Review of Systems  CONSTITUTIONAL: afebrile w/no diaphoresis or weight changes  SKIN: warm, dry w/ no rash or bleeding  EYES: no changes to vision  ENT: no changes in hearing, no sore throat  RESPIRATORY: no cough or SOB  CARDIAC: no chest pain & no palpitations  GI: no abd pain, nausea, vomiting, diarrhea, constipation, or blood in stool/bushra blood  GENITO-URINARY: no discharge, dysuria or hematuria,   MUSCULOSKELETAL:  no joint pain, swelling or redness +KNEE PAIN  NEUROLOGIC: no weakness, headache, anesthesia or paresthesias  PSYCH: no anxiety, non suicidal, non homicidal, without hallucinations or depression

## 2022-05-05 NOTE — ED ADULT NURSE NOTE - OBJECTIVE STATEMENT
Pt [place] from area [place] to [place]. Pt states she woke this am feeling increase weakness in legs. Pt states in the past 3-4 weeks she has been having difficulty with her legs. Pt states she has parkinson's, not currently prescribed any meds for it.

## 2022-05-05 NOTE — ED PROVIDER NOTE - CARE PROVIDER_API CALL
Lamont Rice)  Neurology  [address] [place] [address], [facility] [address]  [place] [address]  Phone: [phone]  Fax: [phone]  Follow Up Time: Routine

## 2022-05-08 LAB
-  AMIKACIN: SIGNIFICANT CHANGE UP
-  AMOXICILLIN/CLAVULANIC ACID: SIGNIFICANT CHANGE UP
-  AMPICILLIN/SULBACTAM: SIGNIFICANT CHANGE UP
-  AMPICILLIN: SIGNIFICANT CHANGE UP
-  AZTREONAM: SIGNIFICANT CHANGE UP
-  CEFAZOLIN: SIGNIFICANT CHANGE UP
-  CEFEPIME: SIGNIFICANT CHANGE UP
-  CEFOXITIN: SIGNIFICANT CHANGE UP
-  CEFTRIAXONE: SIGNIFICANT CHANGE UP
-  CIPROFLOXACIN: SIGNIFICANT CHANGE UP
-  ERTAPENEM: SIGNIFICANT CHANGE UP
-  GENTAMICIN: SIGNIFICANT CHANGE UP
-  LEVOFLOXACIN: SIGNIFICANT CHANGE UP
-  MEROPENEM: SIGNIFICANT CHANGE UP
-  NITROFURANTOIN: SIGNIFICANT CHANGE UP
-  PIPERACILLIN/TAZOBACTAM: SIGNIFICANT CHANGE UP
-  TOBRAMYCIN: SIGNIFICANT CHANGE UP
-  TRIMETHOPRIM/SULFAMETHOXAZOLE: SIGNIFICANT CHANGE UP
CULTURE RESULTS: SIGNIFICANT CHANGE UP
METHOD TYPE: SIGNIFICANT CHANGE UP
ORGANISM # SPEC MICROSCOPIC CNT: SIGNIFICANT CHANGE UP
ORGANISM # SPEC MICROSCOPIC CNT: SIGNIFICANT CHANGE UP
SPECIMEN SOURCE: SIGNIFICANT CHANGE UP

## 2022-06-21 ENCOUNTER — APPOINTMENT (OUTPATIENT)
Dept: NUCLEAR MEDICINE | Facility: CLINIC | Age: 87
End: 2022-06-21
Payer: MEDICARE

## 2022-06-21 ENCOUNTER — OUTPATIENT (OUTPATIENT)
Dept: OUTPATIENT SERVICES | Facility: HOSPITAL | Age: 87
LOS: 1 days | End: 2022-06-21

## 2022-06-21 DIAGNOSIS — R25.1 TREMOR, UNSPECIFIED: ICD-10-CM

## 2022-06-21 PROCEDURE — 78803 RP LOCLZJ TUM SPECT 1 AREA: CPT | Mod: 26

## 2022-08-14 NOTE — ED PROVIDER NOTE - ATTENDING CONTRIBUTION TO CARE
DARIO Estrella: eval for fall     I have personally performed a face to face diagnostic evaluation on this patient.  I have reviewed the ACP note and agree with the history, exam, and plan of care, except as noted.   My medical decision making and observations are found above. DARIO Estrella: 89 yo female with PMHx a fib on eliquis, HTN, parkinsons dementia BIBEMS s/p fall yesterday -- numerous bruises of varying age to lower extremities and R forearm. Pt found to be in a fib with rvr; will check CT scans to r/o fracture vs. bleed, r/o acs, control rate, consult cardiology for syncope work up, tba.     I have personally performed a face to face diagnostic evaluation on this patient.  I have reviewed the resident's note and agree with the history, exam, and plan of care, except as noted.   My medical decision making and observations are found above.

## 2022-08-14 NOTE — ED PROVIDER NOTE - PROGRESS NOTE DETAILS
JK - Patient rate now controlled in the 90s-low 100s s/p diltiazem. Cardiology consulted, recommended TTE for syncope evaluation as well as repeat troponins and EKGs. Patient CPK elevated to 3000s with concern for Rhabdomyolysis; patient given gentle hydration with concern for overload given elevated BNP. Resting comfortably at this time, vss, ready and agreeable to admission to [facility] for further monitoring. Currently stable, rate controlled.

## 2022-08-14 NOTE — CONSULT NOTE ADULT - PROBLEM SELECTOR RECOMMENDATION 2
was on cardizem 360 qd  would start Cardizem 60mg q6h and observe  hold coumadin for now  will need gait evaluation and decision willl need to be made if its safe to restart anticoagulation

## 2022-08-14 NOTE — CONSULT NOTE ADULT - SUBJECTIVE AND OBJECTIVE BOX
[facility]                                              CARDIOLOGY AT [facility]                                                   [address], [place] [address]                                             Telephone: [phone]. Fax:[phone]                                                         CARDIOLOGY CONSULTATION NOTE                                                                                             Consult requested by:  Dr Morgan  History obtained by: Patient and medical record  Community Cardiologist:  Dr. Webb   obtained: Yes [  ] No [x  ]  Reason for Consultation:  ? [place] vs fall  Available out pt records reviewed: Yes [  ] No [x ]    89y/o male on warfarin with documented Afib, HTN, parkinson's brought by EMS for traumatic ground floor fall yesterday . LOC unknown. Patient is confused and the events that occurred are unclear  patient has periorbital ecchymosis and muscular skeletal injury to knees  Discussed with niece last fall was one month ago but prior to that she has had no falls  Prior to this event patient was alert and oriented with no confusion      PAST MEDICAL HISTORY  Hypertension  Parkinson  Afib    PAST SURGICAL HISTORY  No significant past surgical history    SOCIAL HISTORY:    CIGARETTES:   NO  ALCOHOL:  NO  DRUGS:  No  Lives alone      Family History of Cardiovascular Disease:  Yes [  ] No [ x ]  Coronary Artery Disease in first degree relative: Yes [  ] No [x  ]  Sudden Cardiac Death in First degree relative: Yes [  ] No [ x ]    HOME MEDICATIONS:  Cardizem 360mg qd  Lisinopril 10mg qd  Atorvastatin 10mg qd  Coumadin 2.5 mg qd    ALLERGIES: NKda      REVIEW OF SYMPTOMS:   CONSTITUTIONAL: No fever, no chills, no weight loss, no weight gain, no fatigue   ENMT:  No vertigo; No sinus or throat pain  NECK: No pain or stiffness  CARDIOVASCULAR: No chest pain, no dyspnea, no syncope/presyncope, no palpitations, no dizziness, no Orthopnea, no Paroxsymal nocturnal dyspnea  RESPIRATORY: no Shortness of breath, no cough, no wheezing  : No dysuria, no hematuria   GI: No dark color stool, no nausea, no diarrhea, no constipation, no abdominal pain   NEURO: No headache, no slurred speech   MUSCULOSKELETAL:  scabs on knees  PSYCH: Hallucinating  ALL OTHER REVIEW OF SYSTEMS ARE NEGATIVE.      Vital Signs Last 24 Hrs  T(C): 36.7 (14 Aug 2022 16:04), Max: 36.7 (14 Aug 2022 13:05)  T(F): 98 (14 Aug 2022 16:04), Max: 98.1 (14 Aug 2022 13:05)  HR: 102 (14 Aug 2022 16:04) (102 - 104)  BP: 134/78 (14 Aug 2022 16:04) (134/78 - 143/78)  BP(mean): --  RR: 18 (14 Aug 2022 16:04) (18 - 18)  SpO2: 99% (14 Aug 2022 16:04) (98% - 99%)    Parameters below as of 14 Aug 2022 16:04  Patient On (Oxygen Delivery Method): room air      INTAKE AND OUTPUT:     PHYSICAL EXAM:  Constitutional: Comfortable . No acute distress.   HEENT: eyes with periorbital ecchymosis  CNS: A&Ox2 delusional  Respiratory: CTAB, unlabored   Cardiovascular: s1&s2 irregular  Gastrointestinal: Soft, non-tender. +Bowel sounds.   MSK: full ROM extremities x 4  Extremities: No edema. No cyanosis   Psychiatric: Calm .  Skin: Warm and dry, no ulcers on extremities       LABS:                        14.2   12.16 )-----------( 186      ( 14 Aug 2022 12:31 )             42.7     08-14    142  |  105  |  37.1<H>  ----------------------------<  121<H>  4.5   |  24.0  |  1.11    Ca    9.0      14 Aug 2022 12:31  Phos  3.4     08-14  Mg     1.9     08-14    TPro  6.3<L>  /  Alb  3.4  /  TBili  1.4  /  DBili  x   /  AST  97<H>  /  ALT  39<H>  /  AlkPhos  119  08-14    CARDIAC MARKERS ( 14 Aug 2022 12:31 )  x     / 0.02 ng/mL / 3652 U/L / x     / 35.5 ng/mL    ;p-BNP=Serum Pro-Brain Natriuretic Peptide: 3266 pg/mL ( @ 12:31)    PT/INR - ( 14 Aug 2022 12:31 )   PT: 22.2 sec;   INR: 1.90 ratio         PTT - ( 14 Aug 2022 12:31 )  PTT:30.6 sec  Urinalysis Basic - ( 14 Aug 2022 13:43 )    Color: Yellow / Appearance: Clear / S.025 / pH: x  Gluc: x / Ketone: Small  / Bili: Small / Urobili: 1 mg/dL   Blood: x / Protein: 30 mg/dL / Nitrite: Negative   Leuk Esterase: Negative / RBC: 3-5 /HPF / WBC 0-2 /HPF   Sq Epi: x / Non Sq Epi: Few / Bacteria: Few      INTERPRETATION OF TELEMETRY:  atrial fib with mod cheikh     ECG: Atrial fib no ischemic changes  Prior ECG: Yes [  ] No [  ]      RADIOLOGY & ADDITIONAL STUDIES:    CXR napd

## 2022-08-14 NOTE — CONSULT NOTE ADULT - ASSESSMENT
89y/o male on warfarin with documented Afib, HTN, parkinson's brought by EMS for traumatic ground floor fall yesterday 8/13. LOC unknown. Patient is confused and the events that occurred are unclear  patient has periorbital ecchymosis and muscular skeletal injury to knees.  Discussed with niece last fall was one month ago but prior to that she has had no falls  Prior to this event patient was alert and oriented with no confusion

## 2022-08-14 NOTE — H&P ADULT - ATTENDING COMMENTS
Agree with above assessment.  The patient was seen and examined by myself with the surgical PA and resident. The patient arrived as a :Level B trauma following an unwitnessed fall. The cause of the fall is unknown and may possibly be related to syncope.  The patient on exam HEENT NC, bilateral periorbital ecchymosis with EOMI, PERRL no crepitus to palpation, trachea midline, no cervical tenderness, chest b/l air entry, abdomen is soft an non tender, no guarding, no rebound, pelvis is non tender without crepitus, extremities with numerous bruises of varying age, superficial laceration to right forearm, abrasion to left knee.  Head, c-spine, and facial bone CT scans negative for acute traumatic injury.  Patient without active trauma concerns.  Consider admission to medicine for syncope workup as events of fall unclear.

## 2022-08-14 NOTE — ED PROVIDER NOTE - CLINICAL SUMMARY MEDICAL DECISION MAKING FREE TEXT BOX
Patient is a 89 yo female with PMHx a fib on eliquis, HTN, parkinsons dementia BIBEMS s/p fall yesterday while at home. Patient states she was taking care of her pets when she found herself on the ground yesterday, unknown LOC, denies headstrike, no FND, moving all extremities equally; Trauma B called. Patient tachycardic, vitals otherwise stable, ABCs intact, bilateral periorbital ecchymosis, EOMI, PERRLA, numerous bruises of varying age to lower extremities and R forearm. Patient found to be in a fib with rvr; will check CT scans to r/o fracture vs. bleed, r/o acs, control rate, consult cardiology for syncope work up, and continue to monitor.

## 2022-08-14 NOTE — PROGRESS NOTE ADULT - ASSESSMENT
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 Left periorbital soft tissue swelling   Echo ordered   Troponin x 1 negative, trend   Fall precaution   Cardiology consulted   PT evaluation in AM     Rhabdomyolysis   [id number]  Gently hydration   Trend CK     Leukocytosis   WBC: 12.16 with left shift   Pro won also elevated   UA negative   CXR no acute finding   No clear source of infection   Will get blood culture   Hold off on antibiotic for now   Trend WBC     Chronic afib   Patient on Coumadin; high risk of bleed due to multiple fall   On hold as per cardiology   Continue Diltiazem 360mg     HTN/HLD   Atorvastatin 10mg   Lisinopril 10mg     Supportive   DVT prophylaxis: CSD   Diet: cardiac    89 y/o female with PMH a fib on Coumadin, HTN, Parkinson dx, dementia was brought to the ED s/p fall yesterday while at home. Patient is a poor historian, said she probably slid and fell; not sure if she passed out or not. 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 Left periorbital soft tissue swelling.     Fall vs Syncope   Admit to telemetry   Imaging as noted above   Echo ordered   Troponin x 1 negative, trend   Fall precaution   Cardiology consulted   PT evaluation in AM     Rhabdomyolysis   [id number]  Gently hydration   Trend CK     Leukocytosis   WBC: 12.16 with left shift   Pro won also elevated   UA negative   CXR no acute finding   No clear source of infection   Will get blood culture   Hold off on antibiotic for now   Trend WBC     Chronic afib   Patient on Coumadin; high risk of bleed due to multiple fall   On hold as per cardiology   Continue Diltiazem 360mg     HTN/HLD   Atorvastatin 10mg   Lisinopril 10mg     Supportive   DVT prophylaxis: CSD   Diet: cardiac     Plan of care discussed with patient

## 2022-08-14 NOTE — ED PROVIDER NOTE - PHYSICAL EXAMINATION
Constitutional: Well appearing, awake, alert, oriented to person, place, and time/situation and in no apparent distress  ENMT: Airway patent nasal mucosa clear. Mouth with normal mucosa. Throat has no vesicles no oropharyngeal exudates and uvula is midline. No blood in the oropharynx  EYES: clear bilaterally, pupils equal, round and reactive to light  Cardiac: Irregular rhythm, tachycardic Heart sounds S1, S2. No murmurs, rubs or gallops. Good capillary refill, 2+ pulses, no peripheral edema  Respiratory: Lungs CTAB, no use of accessory muscles, no crackles, satting 99% on RA in no distress  Gastrointestinal: Abdomen nondistended, non-tender, no rebound guarding or peritoneal signs  Genitourinary: No CVA tenderness, pelvis nontender, bladder nondistended  Musculoskeletal: Spine appears normal, range of motion is not limited, no muscle or joint tenderness  Neurological: Alert and oriented, no focal deficits, no motor or sensory deficits. CN 2-12 intact, PERRLA, EOMI, No FND, moving all extremities equally, sensation intact, 5/5 strength   Skin: Bilateral periorbital ecchymosis

## 2022-08-14 NOTE — CONSULT NOTE ADULT - PROBLEM SELECTOR RECOMMENDATION 9
r/o syncope  trend trop  cardiac monitoring  Echo ordered  PT consult  ? rhabdo  cpk 3000  iv hydration and monitor renal function

## 2022-08-14 NOTE — ED PROVIDER NOTE - OBJECTIVE STATEMENT
Patient is a 89 yo female with PMHx a fibon eliquis, HTN, parkinsons dementia BIBEMS s/p fall yesterday while at home. Patient states she was taking care of her pets when she found herself on the ground, unknown LOC, denies headstrike, no FND, moving all extremities equally Patient is a 89 yo female with PMHx a fib on eliquis, HTN, parkinsons dementia BIBEMS s/p fall yesterday while at home. Patient states she was taking care of her pets when she found herself on the ground yesterday, unknown LOC, denies headstrike, no FND, moving all extremities equally; Trauma B called. Patient tachycardic, vitals otherwise stable, ABCs intact, bilateral periorbital ecchymosis, EOMI, PERRLA, numerous bruises of varying age to lower extremities and R forearm. CT scans ordered. Denies fevers, chills, dizziness, lightheadedness, dysphagia, dysarthria, diplopia, photophobia, cough, congestion, SOB, CP, abdominal pain, neck pain, back pain, diarrhea, dysuria, hematuria, hematochezia, hematemesis, n/v, recent travel, sick contacts, leg swelling.

## 2022-08-14 NOTE — ED PROVIDER NOTE - NS ED MD DISPO SPECIAL CONSIDERATION1
Consent: The risks of contour defects and dimpling of the skin were reviewed with the patient prior to the injection. Filler Previously Used (Optional): filler Total Volume (Ccs): 0.6 Expiration Date (Optional): 08/2021 Hyaluronidase Preparation: hyaluronidase 150 USP units/ml Administered By (Optional): Dr. Akhil Villarreal Detail Level: Detailed Lot # (Optional): [id number] None

## 2022-08-14 NOTE — H&P ADULT - ASSESSMENT
89F with Afib on warfarin s/p ground floor fall with unknown LOC, found one day later. Noted to have raccoon eyes, left knee ecchymosis and abrasion and right elbow laceration.       Plan:

## 2022-08-14 NOTE — ED ADULT TRIAGE NOTE - CHIEF COMPLAINT QUOTE
BIBEMS s/p fall from standing height. As per EMS patient on the floor since 08/13/2022 2pm. Bruising to face. +Head trauma +Anticoagulants. Trauma B activated

## 2022-08-14 NOTE — H&P ADULT - HISTORY OF PRESENT ILLNESS
89F on warfarin with documented Afib, HTN, parkinsons brought by EMS for traumatic ground floor fall yesterday 8/13. LOC unknown. Unclear if hallucinating as patient is reporting she saw animals when she came to the hospital. Denies chest pain, sob.

## 2022-08-14 NOTE — PROGRESS NOTE ADULT - SUBJECTIVE AND OBJECTIVE BOX
This is HPI note; note progress. Patient initially seen by trauma team who opened HPI note.     89 y/o female with PMH a fib on Coumadin, HTN, Parkinson dx, dementia was brought to the ED s/p fall yesterday while at home. Patient is a poor historian, said she probably slid and fell; not sure if she passed out or not. She has no fever, chills, chest pain, shortness of breath, palpitation, abdominal pain, change in bowel/urinary habit.     PAST MEDICAL & SURGICAL HISTORY:  Hypertension  Parkinson  Afib      REVIEW OF SYSTEMS: see HPI     MEDICATIONS  (STANDING):  atorvastatin 10 milliGRAM(s) Oral at bedtime  lisinopril 10 milliGRAM(s) Oral daily    MEDICATIONS  (PRN):    Allergies: No Known Allergies    SOCIAL HISTORY: lives alone, remote historian of cigarette,     FAMILY HISTORY:      Vital Signs Last 24 Hrs  T(C): 36.5 (14 Aug 2022 19:14), Max: 36.7 (14 Aug 2022 13:05)  T(F): 97.7 (14 Aug 2022 19:14), Max: 98.1 (14 Aug 2022 13:05)  HR: 111 (14 Aug 2022 19:14) (102 - 111)  BP: 142/88 (14 Aug 2022 19:24) (134/78 - 148/102)  BP(mean): --  RR: 18 (14 Aug 2022 19:14) (18 - 18)  SpO2: 98% (14 Aug 2022 19:14) (98% - 99%)    Parameters below as of 14 Aug 2022 19:14  Patient On (Oxygen Delivery Method): room air        PHYSICAL EXAM:      Constitutional:    Eyes:    ENMT:    Neck:    Breasts:    Back:    Respiratory:    Cardiovascular:    Gastrointestinal:    Genitourinary:    Rectal:    Extremities:    Vascular:    Neurological:    Skin:    Lymph Nodes:    Musculoskeletal:    Psychiatric:        LABS:                        14.2   12.16 )-----------( 186      ( 14 Aug 2022 12:31 )             42.7     08-14    142  |  105  |  37.1<H>  ----------------------------<  121<H>  4.5   |  24.0  |  1.11    Ca    9.0      14 Aug 2022 12:31  Phos  3.4     08-  Mg     1.9     -14    TPro  6.3<L>  /  Alb  3.4  /  TBili  1.4  /  DBili  x   /  AST  97<H>  /  ALT  39<H>  /  AlkPhos  119  08-14    PT/INR - ( 14 Aug 2022 12:31 )   PT: 22.2 sec;   INR: 1.90 ratio         PTT - ( 14 Aug 2022 12:31 )  PTT:30.6 sec  Urinalysis Basic - ( 14 Aug 2022 13:43 )    Color: Yellow / Appearance: Clear / S.025 / pH: x  Gluc: x / Ketone: Small  / Bili: Small / Urobili: 1 mg/dL   Blood: x / Protein: 30 mg/dL / Nitrite: Negative   Leuk Esterase: Negative / RBC: 3-5 /HPF / WBC 0-2 /HPF   Sq Epi: x / Non Sq Epi: Few / Bacteria: Few        RADIOLOGY & ADDITIONAL STUDIES: This is HPI note; note progress. Patient initially seen by trauma team who opened HPI note.     89 y/o female with PMH a fib on Coumadin, HTN, Parkinson dx, dementia was brought to the ED s/p fall yesterday while at home. Patient is a poor historian, said she probably slid and fell; not sure if she passed out or not. She has no fever, chills, chest pain, shortness of breath, palpitation, abdominal pain, change in bowel/urinary habit.     PAST MEDICAL & SURGICAL HISTORY:  Hypertension  Parkinson  Afib    REVIEW OF SYSTEMS: see HPI     MEDICATIONS  (STANDING):  atorvastatin 10 milliGRAM(s) Oral at bedtime  lisinopril 10 milliGRAM(s) Oral daily    MEDICATIONS  (PRN):    Allergies: No Known Allergies    SOCIAL HISTORY: lives alone, remote historian of cigarette,     FAMILY HISTORY: non-contributory       Vital Signs Last 24 Hrs  T(C): 36.5 (14 Aug 2022 19:14), Max: 36.7 (14 Aug 2022 13:05)  T(F): 97.7 (14 Aug 2022 19:14), Max: 98.1 (14 Aug 2022 13:05)  HR: 111 (14 Aug 2022 19:14) (102 - 111)  BP: 142/88 (14 Aug 2022 19:24) (134/78 - 148/102)  BP(mean): --  RR: 18 (14 Aug 2022 19:14) (18 - 18)  SpO2: 98% (14 Aug 2022 19:14) (98% - 99%)    Parameters below as of 14 Aug 2022 19:14  Patient On (Oxygen Delivery Method): room air        PHYSICAL EXAM:    Constitutional: elderly laying laying in bed, not in acute distress    Eyes: periorbital ecchymosis. PERRLA     Respiratory: CTA b/l, able to speak in full sentence on RA     Cardiovascular: afib, s1, s2    Gastrointestinal: soft, non-tender, non-distended; BS+    Extremities: no edema    Neurological: awake, alert and oriented x 3, follows command     Skin: left knee ecchymosis and abrasion and right elbow laceration.     Musculoskeletal: ROM intact     Psychiatric: normal mood and affect       LABS:                        14.2   12.16 )-----------( 186      ( 14 Aug 2022 12:31 )             42.7     08-14    142  |  105  |  37.1<H>  ----------------------------<  121<H>  4.5   |  24.0  |  1.11    Ca    9.0      14 Aug 2022 12:31  Phos  3.4     08-  Mg     1.9     -    TPro  6.3<L>  /  Alb  3.4  /  TBili  1.4  /  DBili  x   /  AST  97<H>  /  ALT  39<H>  /  AlkPhos  119  08-14    PT/INR - ( 14 Aug 2022 12:31 )   PT: 22.2 sec;   INR: 1.90 ratio         PTT - ( 14 Aug 2022 12:31 )  PTT:30.6 sec  Urinalysis Basic - ( 14 Aug 2022 13:43 )    Color: Yellow / Appearance: Clear / S.025 / pH: x  Gluc: x / Ketone: Small  / Bili: Small / Urobili: 1 mg/dL   Blood: x / Protein: 30 mg/dL / Nitrite: Negative   Leuk Esterase: Negative / RBC: 3-5 /HPF / WBC 0-2 /HPF   Sq Epi: x / Non Sq Epi: Few / Bacteria: Few

## 2022-08-14 NOTE — ED ADULT NURSE REASSESSMENT NOTE - NS ED NURSE REASSESS COMMENT FT1
Assumed care from previous RN at 1925, POC reviewed. Pt resting comfortably on stretcher, denies HA, dizziness, SOB, CP, chills, pain, N/V/D. Pt AOx3, oriented to place, situation, and year, but confused when asked the month, respirations even and unlabored on RA, skin warm and dry. CM and  in place, bed locked in the lowest position, side rails up.
pt changed and cleaned, linen changed. pt repositioned. denies pain. PT is talkative in no acute distress. Rep even unlabored remains on continuous telemonitoring.
Assumed care of pt at this time from WILFRED Bey from critical area, pt straight cathed urine sample sent to lab, Covid swab sent to lab. IV fluids ongoing. c Collar in place.
Pt is A&OX3 oriented to self, place, and situation, but unable to tell the month, pt appears confused stating she needs to go in her basement and she is leaving to get Wendys, respirations even and unlabored on RA, skin warm and dry, periorbital ecchymosis noted, pt is being transferred to CDU Bed 8R and report given to WILFRED Call, pts current condition, medical history and reason for admission reviewed, IV site is clean/dry/intact, pt is not in any pain or distress at this time, pt is aware of the transfer, the need for the transfer and acclimated to the new unit, provided the call button, personal items within reach, bed is in lowest position, wheels are locked, tele monitor in place, pt education deemed successful after teach back shows proficiency.

## 2022-08-14 NOTE — H&P ADULT - NSHPPHYSICALEXAM_GEN_ALL_CORE
General: NAD  Head: racoon eyes  Neck: c-collar in place  Cardio: RRR  Pulm: no respiratory distress  Abdomen: soft, non-tender, non-distended   Extremities: no clubbing or cyanosis  Vascular: Femoral, PT, DP pulses 2+  Neuro: A&O x 3

## 2022-08-15 NOTE — PHYSICAL THERAPY INITIAL EVALUATION ADULT - ADDITIONAL COMMENTS
Pt reports living alone in senior housing. Ambulates with a cane. Owns a RW. Drives. No stairs. No assist available.

## 2022-08-15 NOTE — CHART NOTE - NSCHARTNOTEFT_GEN_A_CORE
Patient follows with [facility]  Dr. George made aware, he will call them

## 2022-08-15 NOTE — CHART NOTE - NSCHARTNOTEFT_GEN_A_CORE
Tertiary Trauma Survey (TTS)    Date of TTS: 08-15-22 @ 11:31                             Admit Date: 08-14-22 @ 20:10      Trauma Activation:      Subjective / 24 hour events: Pt able to sleep last night. Wishing to leave soon. Obvious bilateral periorbital redness. No pain without palpation. Has not gotten out of bed since admission. Mentioned being scratched by cat a week ago in same location of R elbow abrasion, no swelling to the axilla, no itching or swelling to area since.     Vital Signs Last 24 Hrs  T(C): 36.7 (14 Aug 2022 22:46), Max: 36.7 (14 Aug 2022 13:05)  T(F): 98 (14 Aug 2022 22:46), Max: 98.1 (14 Aug 2022 13:05)  HR: 103 (14 Aug 2022 22:46) (99 - 111)  BP: 129/73 (14 Aug 2022 22:46) (129/73 - 157/89)  BP(mean): --  RR: 16 (14 Aug 2022 22:46) (16 - 20)  SpO2: 94% (14 Aug 2022 22:46) (94% - 99%)    Parameters below as of 14 Aug 2022 22:46  Patient On (Oxygen Delivery Method): room air        Physical Exam:    Neuro: [x ] non focal neurological exam [ ] Focal Neurological deficits noted to be:     HEENT: [x] Normo-cephalic/atraumatic  [x ] abnormalities noted to be: periorbital ecchymosis, TTP L proximal eyebrow    Pulm/Chest:  [x ] CTA b/l  [x ] chest wall non tender     Cardiac: [x ] S1S2, sinus rhythm  [ ] abnormalities noted to be:     GI / Abdomen: [ x] Soft, non-tender, non-distended [ ] abnormalities noted to be:    Musculoskeletal / Extremities: [ ]full ROM to all other extremities  [x ]  abnormalities noted to be: decreased range of passive and active motion to R knee. Good shoulder shrug. Weakness noted with shoulder extension, but passive and active ROM intact.     Integumentary: [ x] Skin intact [ x] Warm [x ] Dry [ x]abnormalities noted to be: 2 cm x 2cm abrasion with left over dead tissue noted on right distal elbow     Vascular: [ x] 1+ palpable distal pulses  [ ] AMINATA:   [ ] abnormalities noted to be:      List Injuries Identified to Date:    List Operative and Interventional Radiological Procedures:     Consults (Date): medicine primary - for a syncope workup   [  ] Neurosurgery   [  ] Orthopedics  [  ] Plastics  [  ] Urology  [  ] PM&R  [  ] Social Work    RADIOLOGICAL FINDINGS REVIEW:  CXR: PROCEDURE DATE: 08/14/2022    INTERPRETATION: CLINICAL STATEMENT: Fall    TECHNIQUE: AP view of the chest.      COMPARISON: 1/9/2018    The cardiomediastinal silhouette is normal and the jelena are not enlarged. Aortic calcification. The trachea is midline. There is no focal lung consolidation or sizable pleural effusion. No pneumothorax. Visualized bony thorax grossly intact. Degenerative changes of the spine and shoulders.    IMPRESSION:    Unremarkable frontal chest x ray    Pelvis Films:     C-Spine Films:    T/L/S Spine Films:    Extremity Films: 8/14   INTERPRETATION: Right elbow and left knee. Patient had local trauma.  Right elbow. 3 views.  No elbow effusion. Mild degeneration. No fracture.  Slight swelling dorsal to the proximal ulna.  Left knee. 3 views.  Arterial calcifications. No effusion. Mild to moderate degeneration. No fracture.    IMPRESSION: No acute findings.    Head CT:    PROCEDURE DATE: 08/14/2022        INTERPRETATION: CT HEAD, CT MAXILLOFACIAL, CT CERVICAL SPINE    INDICATIONS: Trauma Code, BIBEMS s/p fall from standing height. As per EMS  patient on the floor since 08/13/2022 2pm. Bruising to face. +Head trauma. LOC unknown. Unclear if hallucinating as patient is reporting she saw animals when she came to the hospital. +Anticoagulants. on warfarin with documented Afib, HTN, parkinsons    CT BRAIN:    TECHNIQUE: Multiple contiguous axial images were obtained from the skull base to the vertex without the use of intravenous contrast.    COMPARISON EXAMINATION: Head CT 5/5/2022    FINDINGS:  Ventricles and sulci: Parenchymal volume loss is present which is commensurate with patient age.  Intra-axial: There are hemispheric white matter areas of low attenuation which are nonspecific but likely related to sequelae of microvascular disease. Old lacunar infarct left basal ganglia.  No intracranial mass, acute hemorrhage, or significant midline shift is present.  Extra-axial: There is no extra-axial collection.  Visualized sinuses: No air-fluid levels are identified. Clear.  Visualized mastoids: Clear.  Calvarium: Unremarkable.  Miscellaneous: Bilateral cataract surgery.    Impression: See below    ======================================================================================    MAXILLOFACIAL CT:    TECHNIQUE: This examination consists of axial 1.25 mm sections from the frontal sinuses through the mandible. The study was supplemented with coronal and sagittal reformatted images. Dedicated 3-D images were made using dedicated software and viewed on a dedicated 3-D workstation in multiple planes.    Intravenous contrast was not administered.    FINDINGS:    Left periorbital soft tissue swelling.    Evaluation of the osseous structures demonstrates no evidence of fracture.    The visualized sinuses demonstrate no evidence of opacification or mucosal thickening.    Right maxillary periapical lucencies.    The nasopharynx is normal in appearance.    The parotid glands are normal and symmetric in appearance.    The  space is normal bilaterally.    Parapharyngeal space is normal bilaterally.    The tongue base is normal in appearance.    The epiglottis is normal in thickness and appearance.    [place] tonsils are normal in appearance.    The submandibular glands are normal and symmetric in appearance bilaterally.    The platysma is normal in thickness.    Sternocleidomastoid muscles are normal and symmetric in appearance bilaterally where visualized.    Anterior strap muscles are unremarkable where visualized.    Carotid space is normal bilaterally where visualized.    No suspicious adenopathy.      IMPRESSION: See below    ======================================================================================    CT CERVICAL SPINE:    TECHNIQUE: Axial images were obtained through the cervical spine using multislice helical technique. Reformatted coronal and sagittal images were performed.    COMPARISON EXAMINATION: None available at this time.    FINDINGS:  On the sagittal reformations, there is no prevertebral soft tissue swelling. There is no splaying of the spinous processes.  On the coronal reformations, occipital condyles are normal. Lateral masses of C1 align normally with C2.  On the axial images, no lucent fracture line is identified.    Multilevel severe degenerative osteoarthritis is present. Findings include marginal osteophytes, uncovertebral spurring, and facet joint space compartment narrowing with subchondral sclerosis and hypertrophic osteophytes at multiple levels. There is multilevel severe degenerative disc disease. Findings include loss of normal disc space height and endplate sclerosis.    Miscellaneous: None.    IMPRESSIONS:    Head CT: No CT evidence of acute intracranial hemorrhage.  Old lacunar infarct left basal ganglia.    Maxillofacial CT: Left periorbital soft tissue swelling. No underlying fracture.  Periapical lucencies in the right maxilla.    C-spine CT: No acute fracture.    Chest CT:    ABD/Pelvis CT:    Other:    Assessment: 89 yo female with PMH of AFib on warfarin and Parkinsons disease presented after a ground level fall, admitted to medicine to workup possible syncopal fall.     Plan:   - clean, debride and dress R elbow wound. Order bacitracin for nursing to put on wound. Clean and apply bacitracin every day. Change dressing as needed. Nursing wound care instructions ordered.  - final plain films read, no fracture to R elbow or R knee, no intervention necessary  - pt recommended to move around more to improve general weakness throughout her body  - no additional injuries found, recontact trauma as needed.

## 2022-08-15 NOTE — PHYSICAL THERAPY INITIAL EVALUATION ADULT - PERTINENT HX OF CURRENT PROBLEM, REHAB EVAL
89 y/o female s/p fall. Found a day later. right elbow abrasion, left knee pain. ?syncope. imagine negative for acute fxs

## 2022-08-15 NOTE — PROGRESS NOTE ADULT - SUBJECTIVE AND OBJECTIVE BOX
ANG MCCAIN  ----------------------------------------  The patient was seen at bedside. Patient with an episode of fall and possible syncope. The patient was denied any headache.    Vital Signs Last 24 Hrs  T(C): 36.7 (14 Aug 2022 22:46), Max: 36.7 (14 Aug 2022 16:04)  T(F): 98 (14 Aug 2022 22:46), Max: 98 (14 Aug 2022 16:04)  HR: 103 (14 Aug 2022 22:46) (99 - 111)  BP: 129/73 (14 Aug 2022 22:46) (129/73 - 157/89)  BP(mean): --  RR: 16 (14 Aug 2022 22:46) (16 - 20)  SpO2: 94% (14 Aug 2022 22:46) (94% - 99%)    Parameters below as of 14 Aug 2022 22:46  Patient On (Oxygen Delivery Method): room air    PHYSICAL EXAMINATION:  ----------------------------------------  General appearance: No acute distress, Awake, Alert  HEENT: Normocephalic, Conjunctiva clear, EOMI, Periorbital echymosis  Neck: Supple, No JVD, No tenderness  Lungs: Breath sound equal bilaterally, No wheezes, No rales  Cardiovascular: S1S2, Regular rhythm  Abdomen: Soft, Nontender, Nondistended, No guarding/rebound, Positive bowel sounds  Extremities: No clubbing, No cyanosis, No edema, No calf tenderness  Neuro: Strength equal bilaterally, No tremors  Psychiatric: Appropriate mood, Normal affect    LABORATORY STUDIES:  ----------------------------------------             13.9   10.76 )-----------( 146      ( 15 Aug 2022 03:36 )             42.6     08-15    142  |  108<H>  |  36.4<H>  ----------------------------<  92  3.9   |  24.0  |  0.74    Ca    8.5      15 Aug 2022 03:36  Phos  3.4       Mg     1.9         TPro  5.9<L>  /  Alb  2.9<L>  /  TBili  1.3  /  DBili  x   /  AST  94<H>  /  ALT  41<H>  /  AlkPhos  108  15    LIVER FUNCTIONS - ( 15 Aug 2022 03:36 )  Alb: 2.9 g/dL / Pro: 5.9 g/dL / ALK PHOS: 108 U/L / ALT: 41 U/L / AST: 94 U/L / GGT: x           PT/INR - ( 14 Aug 2022 12:31 )   PT: 22.2 sec;   INR: 1.90 ratio         PTT - ( 14 Aug 2022 12:31 )  PTT:30.6 sec    CARDIAC MARKERS ( 15 Aug 2022 03:36 )  x     / 0.02 ng/mL / 2345 U/L / x     / 27.5 ng/mL  CARDIAC MARKERS ( 14 Aug 2022 12:31 )  x     / 0.02 ng/mL / 3652 U/L / x     / 35.5 ng/mL          22 @ 07:01  -  08-15-22 @ 07:00  --------------------------------------------------------  IN: 0 mL / OUT: 100 mL / NET: -100 mL        Urinalysis Basic - ( 14 Aug 2022 13:43 )    Color: Yellow / Appearance: Clear / S.025 / pH: x  Gluc: x / Ketone: Small  / Bili: Small / Urobili: 1 mg/dL   Blood: x / Protein: 30 mg/dL / Nitrite: Negative   Leuk Esterase: Negative / RBC: 3-5 /HPF / WBC 0-2 /HPF   Sq Epi: x / Non Sq Epi: Few / Bacteria: Few          MEDICATIONS  (STANDING):  atorvastatin 10 milliGRAM(s) Oral at bedtime  diltiazem    Tablet 60 milliGRAM(s) Oral every 6 hours  lisinopril 10 milliGRAM(s) Oral daily    MEDICATIONS  (PRN):  acetaminophen     Tablet .. 650 milliGRAM(s) Oral every 6 hours PRN Temp greater or equal to 38C (100.4F), Mild Pain (1 - 3)  aluminum hydroxide/magnesium hydroxide/simethicone Suspension 30 milliLiter(s) Oral every 4 hours PRN Dyspepsia  BACItracin   Ointment 1 Application(s) Topical daily PRN for wound change  melatonin 3 milliGRAM(s) Oral at bedtime PRN Insomnia  ondansetron Injectable 4 milliGRAM(s) IV Push every 8 hours PRN Nausea and/or Vomiting      ASSESSMENT / PLAN:  ----------------------------------------   ANG MCCAIN  ----------------------------------------  The patient was seen at bedside. Patient with an episode of fall and possible syncope. The patient was denied any headache.    Vital Signs Last 24 Hrs  T(C): 36.7 (14 Aug 2022 22:46), Max: 36.7 (14 Aug 2022 16:04)  T(F): 98 (14 Aug 2022 22:46), Max: 98 (14 Aug 2022 16:04)  HR: 103 (14 Aug 2022 22:46) (99 - 111)  BP: 129/73 (14 Aug 2022 22:46) (129/73 - 157/89)  BP(mean): --  RR: 16 (14 Aug 2022 22:46) (16 - 20)  SpO2: 94% (14 Aug 2022 22:46) (94% - 99%)    Parameters below as of 14 Aug 2022 22:46  Patient On (Oxygen Delivery Method): room air    PHYSICAL EXAMINATION:  ----------------------------------------  General appearance: No acute distress, Awake, Alert  HEENT: Normocephalic, Conjunctiva clear, EOMI, Periorbital echymosis  Neck: Supple, No JVD, No tenderness  Lungs: Breath sound equal bilaterally, No wheezes, No rales  Cardiovascular: S1S2, Regular rhythm  Abdomen: Soft, Nontender, Nondistended, No guarding/rebound, Positive bowel sounds  Extremities: No clubbing, No cyanosis, No edema, No calf tenderness, Right elbow dressing clean and intact, Abrasions to the knees  Neuro: Strength equal bilaterally, No tremors  Psychiatric: Appropriate mood, Normal affect    LABORATORY STUDIES:  ----------------------------------------             13.9   10.76 )-----------( 146      ( 15 Aug 2022 03:36 )             42.6     08-15    142  |  108<H>  |  36.4<H>  ----------------------------<  92  3.9   |  24.0  |  0.74    Ca    8.5      15 Aug 2022 03:36  Phos  3.4     08-14  Mg     1.9     08-14    TPro  5.9<L>  /  Alb  2.9<L>  /  TBili  1.3  /  DBili  x   /  AST  94<H>  /  ALT  41<H>  /  AlkPhos  108  08-15    LIVER FUNCTIONS - ( 15 Aug 2022 03:36 )  Alb: 2.9 g/dL / Pro: 5.9 g/dL / ALK PHOS: 108 U/L / ALT: 41 U/L / AST: 94 U/L / GGT: x           PT/INR - ( 14 Aug 2022 12:31 )   PT: 22.2 sec;   INR: 1.90 ratio         PTT - ( 14 Aug 2022 12:31 )  PTT:30.6 sec    CARDIAC MARKERS ( 15 Aug 2022 03:36 )  x     / 0.02 ng/mL / 2345 U/L / x     / 27.5 ng/mL  CARDIAC MARKERS ( 14 Aug 2022 12:31 )  x     / 0.02 ng/mL / 3652 U/L / x     / 35.5 ng/mL    Urinalysis Basic - ( 14 Aug 2022 13:43 )  Color: Yellow / Appearance: Clear / S.025 / pH: x  Gluc: x / Ketone: Small  / Bili: Small / Urobili: 1 mg/dL   Blood: x / Protein: 30 mg/dL / Nitrite: Negative   Leuk Esterase: Negative / RBC: 3-5 /HPF / WBC 0-2 /HPF   Sq Epi: x / Non Sq Epi: Few / Bacteria: Few    MEDICATIONS  (STANDING):  atorvastatin 10 milliGRAM(s) Oral at bedtime  diltiazem    Tablet 60 milliGRAM(s) Oral every 6 hours  lisinopril 10 milliGRAM(s) Oral daily    MEDICATIONS  (PRN):  acetaminophen     Tablet .. 650 milliGRAM(s) Oral every 6 hours PRN Temp greater or equal to 38C (100.4F), Mild Pain (1 - 3)  aluminum hydroxide/magnesium hydroxide/simethicone Suspension 30 milliLiter(s) Oral every 4 hours PRN Dyspepsia  BACItracin   Ointment 1 Application(s) Topical daily PRN for wound change  melatonin 3 milliGRAM(s) Oral at bedtime PRN Insomnia  ondansetron Injectable 4 milliGRAM(s) IV Push every 8 hours PRN Nausea and/or Vomiting      ASSESSMENT / PLAN:  ----------------------------------------  89F with a history of atrial fibrillation, Parkinson's disease, dementia, and hypertension who presented with an episode of fall at home. The patient reported that she had tripped at home but was unsure of the details.    Fall - CT of the head was without acute intracranial pathology. Xray of the knee and elbow were without acute findings. The patient was seen by Trauma Surgery and no surgical intervention was recommended at the time. Serial troponin levels were not elevated. On telemetry monitoring. Pending echocardiogram. For Physical Therapy evaluation.    Rhabdomyolysis - CPK level improved on repeat studies. Intravenous fluids previously administered.    Atrial fibrillation - On diltiazem. Warfarin held due the history of falls.    Parkinson's disease - For Physical Therapy evaluation    Hypertension - Close blood pressure monitoring. On lisinopril.    Leukocytosis - Afebrile. Urinalysis was not indicative of infection. Chest Xray was without acute pulmonary disease.

## 2022-08-15 NOTE — PHYSICAL THERAPY INITIAL EVALUATION ADULT - GENERAL OBSERVATIONS, REHAB EVAL
Pt received lying in bed on 2 gulden, (+) purewick, (+) bed alarm, (+) tele, NAD. Agreeable to PT evaluation with some encouragement

## 2022-08-16 NOTE — PATIENT PROFILE ADULT - FALL HARM RISK - HARM RISK INTERVENTIONS

## 2022-08-16 NOTE — PROGRESS NOTE ADULT - SUBJECTIVE AND OBJECTIVE BOX
ANG MCCAIN  ----------------------------------------  The patient was seen at bedside. Patient with an episode of fall. Offered no complaints.    Vital Signs Last 24 Hrs  T(C): 36.6 (16 Aug 2022 09:33), Max: 36.6 (15 Aug 2022 17:00)  T(F): 97.8 (16 Aug 2022 09:33), Max: 97.9 (15 Aug 2022 17:00)  HR: 92 (16 Aug 2022 11:00) (91 - 111)  BP: 105/68 (16 Aug 2022 09:33) (100/60 - 106/68)  BP(mean): --  RR: 17 (16 Aug 2022 09:33) (17 - 17)  SpO2: 94% (16 Aug 2022 09:33) (94% - 99%)    Parameters below as of 16 Aug 2022 09:33  Patient On (Oxygen Delivery Method): room air    PHYSICAL EXAMINATION:  ----------------------------------------  General appearance: No acute distress, Awake, Alert  HEENT: Normocephalic, Conjunctiva clear, EOMI, Periorbital ecchymosis  Neck: Supple, No JVD, No tenderness  Lungs: Breath sound equal bilaterally, No wheezes, No rales  Cardiovascular: S1S2, Regular rhythm  Abdomen: Soft, Nontender, Nondistended, No guarding/rebound, Positive bowel sounds  Extremities: No clubbing, No cyanosis, No edema, No calf tenderness, Right elbow dressing clean and intact, Abrasions to the right shoulder and knees  Neuro: Strength equal bilaterally, No tremors  Psychiatric: Appropriate mood, Normal affect    LABORATORY STUDIES:  ----------------------------------------             13.3   9.24  )-----------( 183      ( 16 Aug 2022 06:35 )             39.3     08-16    136  |  104  |  41.1<H>  ----------------------------<  90  4.2   |  22.0  |  0.87    Ca    8.4      16 Aug 2022 06:35    TPro  5.9<L>  /  Alb  2.9<L>  /  TBili  1.3  /  DBili  x   /  AST  94<H>  /  ALT  41<H>  /  AlkPhos  108  08-15    LIVER FUNCTIONS - ( 15 Aug 2022 03:36 )  Alb: 2.9 g/dL / Pro: 5.9 g/dL / ALK PHOS: 108 U/L / ALT: 41 U/L / AST: 94 U/L / GGT: x           CARDIAC MARKERS ( 16 Aug 2022 06:35 )  x     / x     / 991 U/L / x     / 12.6 ng/mL  CARDIAC MARKERS ( 15 Aug 2022 03:36 )  x     / 0.02 ng/mL / 2345 U/L / x     / 27.5 ng/mL    Urinalysis Basic - ( 14 Aug 2022 13:43 )  Color: Yellow / Appearance: Clear / S.025 / pH: x  Gluc: x / Ketone: Small  / Bili: Small / Urobili: 1 mg/dL   Blood: x / Protein: 30 mg/dL / Nitrite: Negative   Leuk Esterase: Negative / RBC: 3-5 /HPF / WBC 0-2 /HPF   Sq Epi: x / Non Sq Epi: Few / Bacteria: Few    Culture - Blood (collected 15 Aug 2022 03:36)  Source: .Blood Blood  Preliminary Report (16 Aug 2022 09:01):    No growth to date.    Culture - Blood (collected 15 Aug 2022 03:26)  Source: .Blood Blood  Preliminary Report (16 Aug 2022 09:01):    No growth to date.    MEDICATIONS  (STANDING):  atorvastatin 10 milliGRAM(s) Oral at bedtime  diltiazem    Tablet 60 milliGRAM(s) Oral every 6 hours  lisinopril 10 milliGRAM(s) Oral daily    MEDICATIONS  (PRN):  acetaminophen     Tablet .. 650 milliGRAM(s) Oral every 6 hours PRN Temp greater or equal to 38C (100.4F), Mild Pain (1 - 3)  aluminum hydroxide/magnesium hydroxide/simethicone Suspension 30 milliLiter(s) Oral every 4 hours PRN Dyspepsia  BACItracin   Ointment 1 Application(s) Topical daily PRN for wound change  melatonin 3 milliGRAM(s) Oral at bedtime PRN Insomnia  ondansetron Injectable 4 milliGRAM(s) IV Push every 8 hours PRN Nausea and/or Vomiting      ASSESSMENT / PLAN:  ----------------------------------------  89F with a history of atrial fibrillation, Parkinson's disease, dementia, and hypertension who presented with an episode of fall at home. The patient reported that she had tripped at home but was unsure of the details.    Fall - CT of the head was without acute intracranial pathology. Xray of the knee and elbow were without acute findings. The patient was seen by Trauma Surgery and no surgical intervention was recommended at the time. Serial troponin levels were not elevated. On telemetry monitoring without significant arrhythmia other than atrial fibrillation. Echocardiogram results to be reviewed when available.    Rhabdomyolysis - Intravenous fluids previously administered. CPK levels improved on repeat studies.    Atrial fibrillation - On diltiazem. Warfarin held due the history of falls.    Parkinson's disease - Physical Therapy evaluation noted. The patient wished to discuss with her niece in regards to possible treatment at a rehabilitation facility.    Hypertension - Close blood pressure monitoring. On diltiazem and lisinopril.    Leukocytosis - Resolved. Afebrile. Urinalysis was not indicative of infection. Chest Xray was without acute pulmonary disease.

## 2022-08-17 NOTE — DISCHARGE NOTE PROVIDER - HOSPITAL COURSE
89F presented after sustaining a fall at home the day prior. On presentation, WBC(12.16), AST/ALT(97/39), CPK(3652), BNP(3266). Urinalysis was not indicative of infection. 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 Xray of the left knee and right elbow was without acute findings. The patient was seen by Trauma Surgery in consultation and no intervention was recommended at the time. The patient was seen by Cardiology in consultation for atrial fibrillation and possible syncope and warfarin was held. Echocardiogram noted mildly increased left ventricular wall thickness, normal global left ventricular systolic function, ejection fraction of 60 to 65%, moderate to severe left atrial enlargement, mildly enlarged right atrium, no pericardial effusion. Serial troponin levels were not elevated and telemetry was without significant arrhythmias. Blood cultures were without growth. Repeat studies noted continued improvement in the CPK level. The patient was thought to benefit from further treatment at a rehabilitation facility prior to returning home.        37 minutes total time   89F presented after sustaining a fall at home the day prior. On presentation, WBC(12.16), AST/ALT(97/39), CPK(3652), BNP(3266). Urinalysis was not indicative of infection. 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 Xray of the left knee and right elbow was without acute findings. The patient was seen by Trauma Surgery in consultation and no intervention was recommended at the time. The patient was seen by Cardiology in consultation for atrial fibrillation and possible syncope and warfarin was held. Echocardiogram noted mildly increased left ventricular wall thickness, normal global left ventricular systolic function, ejection fraction of 60 to 65%, moderate to severe left atrial enlargement, mildly enlarged right atrium, no pericardial effusion. Serial troponin levels were not elevated and telemetry was without significant arrhythmias. Blood cultures were without growth. Repeat studies noted continued improvement in the CPK level. The patient was thought to benefit from further treatment at a rehabilitation facility prior to returning home. The patient had an episode of urine retention requiring catheterization which resolved the next morning.        37 minutes total time   89F presented after sustaining a fall at home the day prior. On presentation, WBC(12.16), AST/ALT(97/39), CPK(3652), BNP(3266). Urinalysis was not indicative of infection. 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 Xray of the left knee and right elbow was without acute findings. The patient was seen by Trauma Surgery in consultation and no intervention was recommended at the time. The patient was seen by Cardiology in consultation for atrial fibrillation and possible syncope and warfarin was held. Echocardiogram noted mildly increased left ventricular wall thickness, normal global left ventricular systolic function, ejection fraction of 60 to 65%, moderate to severe left atrial enlargement, mildly enlarged right atrium, no pericardial effusion. Serial troponin levels were not elevated and telemetry was without significant arrhythmias. Blood cultures were without growth. Repeat studies noted continued improvement in the CPK level. The patient was thought to benefit from further treatment at a rehabilitation facility prior to returning home. The patient had an episode of urine retention requiring catheterization which resolved the next morning. Developed small UTI during last few days of stay, bactrim 7 day course started        37 minutes total time

## 2022-08-17 NOTE — DISCHARGE NOTE PROVIDER - DETAILS OF MALNUTRITION DIAGNOSIS/DIAGNOSES
This patient has been assessed with a concern for Malnutrition and was treated during this hospitalization for the following Nutrition diagnosis/diagnoses:     -  08/19/2022: Moderate protein-calorie malnutrition

## 2022-08-17 NOTE — DISCHARGE NOTE PROVIDER - NSDCCPCAREPLAN_GEN_ALL_CORE_FT
PRINCIPAL DISCHARGE DIAGNOSIS  Diagnosis: Atrial fibrillation with RVR  Assessment and Plan of Treatment: Continue on diltiazem. Warfarin was held for now due to falling. Follow up with your primary care physician or cadiology prior to resuming the medicaiton.      SECONDARY DISCHARGE DIAGNOSES  Diagnosis: Rhabdomyolysis  Assessment and Plan of Treatment: CPK levels continued to improve and kidney function remained normal.     PRINCIPAL DISCHARGE DIAGNOSIS  Diagnosis: Atrial fibrillation with RVR  Assessment and Plan of Treatment: Continue on diltiazem. Warfarin was held for now due to falling. Follow up with your primary care physician or cadiology prior to resuming the medicaiton.      SECONDARY DISCHARGE DIAGNOSES  Diagnosis: Rhabdomyolysis  Assessment and Plan of Treatment: CPK levels continued to improve and kidney function remained normal.    Diagnosis: Urine retention  Assessment and Plan of Treatment: On tamsulosin. Intermittent catheterization as needed.     PRINCIPAL DISCHARGE DIAGNOSIS  Diagnosis: Atrial fibrillation with RVR  Assessment and Plan of Treatment: Continue on diltiazem. Warfarin was held for now due to falling. Follow up with your primary care physician or cadiology prior to resuming the medicaiton.      SECONDARY DISCHARGE DIAGNOSES  Diagnosis: Rhabdomyolysis  Assessment and Plan of Treatment: CPK levels continued to improve and kidney function remained normal.    Diagnosis: Urine retention  Assessment and Plan of Treatment: On tamsulosin. Intermittent catheterization as needed.    Diagnosis: Acute UTI  Assessment and Plan of Treatment: Burning on urination, started on bactrim, take abx for 7 days total, started 8/22/22

## 2022-08-17 NOTE — PROGRESS NOTE ADULT - SUBJECTIVE AND OBJECTIVE BOX
ANG MCCAIN  ----------------------------------------  The patient was seen at bedside. Patient with an episode of fall. Offered no complaints. Denied headache or dizziness.    Vital Signs Last 24 Hrs  T(C): 36.5 (17 Aug 2022 11:00), Max: 37 (17 Aug 2022 04:56)  T(F): 97.7 (17 Aug 2022 11:00), Max: 98.6 (17 Aug 2022 04:56)  HR: 78 (17 Aug 2022 11:00) (78 - 90)  BP: 128/87 (17 Aug 2022 11:00) (117/75 - 148/82)  BP(mean): --  RR: 18 (17 Aug 2022 11:00) (17 - 18)  SpO2: 93% (17 Aug 2022 11:00) (93% - 97%)    Parameters below as of 17 Aug 2022 11:00  Patient On (Oxygen Delivery Method): room air    PHYSICAL EXAMINATION:  ----------------------------------------  General appearance: No acute distress, Awake, Alert  HEENT: Normocephalic, Conjunctiva clear, EOMI, Periorbital ecchymosis  Neck: Supple, No JVD, No tenderness  Lungs: Breath sound equal bilaterally, No wheezes, No rales  Cardiovascular: S1S2, Regular rhythm  Abdomen: Soft, Nontender, Nondistended, No guarding/rebound, Positive bowel sounds  Extremities: No clubbing, No cyanosis, No edema, No calf tenderness, Right elbow dressing clean and intact, Abrasions to the right shoulder and knees  Neuro: Strength equal bilaterally, No tremors  Psychiatric: Appropriate mood, Normal affect    LABORATORY STUDIES:  ----------------------------------------             13.3   9.24  )-----------( 183      ( 16 Aug 2022 06:35 )             39.3     08-17    139  |  107  |  34.6<H>  ----------------------------<  109<H>  4.6   |  24.0  |  0.71    Ca    8.3<L>      17 Aug 2022 05:40    CARDIAC MARKERS ( 17 Aug 2022 05:40 )  x     / x     / 794 U/L / x     / 12.0 ng/mL  CARDIAC MARKERS ( 16 Aug 2022 06:35 )  x     / x     / 991 U/L / x     / 12.6 ng/mL    Culture - Blood (collected 15 Aug 2022 03:36)  Source: .Blood Blood  Preliminary Report (16 Aug 2022 09:01):    No growth to date.    Culture - Blood (collected 15 Aug 2022 03:26)  Source: .Blood Blood  Preliminary Report (16 Aug 2022 09:01):    No growth to date.    MEDICATIONS  (STANDING):  atorvastatin 10 milliGRAM(s) Oral at bedtime  diltiazem    Tablet 60 milliGRAM(s) Oral every 6 hours  lisinopril 10 milliGRAM(s) Oral daily  senna 2 Tablet(s) Oral at bedtime    MEDICATIONS  (PRN):  acetaminophen     Tablet .. 650 milliGRAM(s) Oral every 6 hours PRN Temp greater or equal to 38C (100.4F), Mild Pain (1 - 3)  aluminum hydroxide/magnesium hydroxide/simethicone Suspension 30 milliLiter(s) Oral every 4 hours PRN Dyspepsia  BACItracin   Ointment 1 Application(s) Topical daily PRN for wound change  melatonin 3 milliGRAM(s) Oral at bedtime PRN Insomnia  ondansetron Injectable 4 milliGRAM(s) IV Push every 8 hours PRN Nausea and/or Vomiting      ASSESSMENT / PLAN:  ----------------------------------------  89F with a history of atrial fibrillation, Parkinson's disease, dementia, and hypertension who presented with an episode of fall at home. The patient reported that she had tripped at home but was unsure of the details.    Fall - CT of the head was without acute intracranial pathology. Xray of the knee and elbow were without acute findings. The patient was seen by Trauma Surgery and no surgical intervention was recommended at the time. Serial troponin levels were not elevated. Telemetry monitoring without significant arrhythmia other than atrial fibrillation. Echocardiogram noted preserved left ventricular systolic function without significant valvular dysfunction.    Rhabdomyolysis - Intravenous fluids were previously administered. CPK levels improved on repeat studies.    Atrial fibrillation - On diltiazem. Warfarin held due the history of falls.    Parkinson's disease - Physical Therapy evaluation noted. Awaiting transfer to a rehabilitation facility for further treatment.    Hypertension - Close blood pressure monitoring. On diltiazem and lisinopril.    Leukocytosis - Resolved. Afebrile. Urinalysis was not indicative of infection. Chest Xray was without acute pulmonary disease.

## 2022-08-17 NOTE — DISCHARGE NOTE PROVIDER - CARE PROVIDER_API CALL
AMARILIS LOGAN  Internal Medicine  [address] [place] [address] [address]  [place] [address]  Phone: [phone]  Fax: [phone]  Follow Up Time:

## 2022-08-17 NOTE — DISCHARGE NOTE PROVIDER - NSDCMRMEDTOKEN_GEN_ALL_CORE_FT
atorvastatin 10 mg oral tablet: 1 tab(s) orally once a day  DilTIAZem (Eqv-Cardizem CD) 360 mg/24 hours oral capsule, extended release: 1 cap(s) orally once a day  lisinopril 10 mg oral tablet: 1 tab(s) orally once a day  senna leaf extract oral tablet: 2 tab(s) orally once a day (at bedtime)   atorvastatin 10 mg oral tablet: 1 tab(s) orally once a day  DilTIAZem (Eqv-Cardizem CD) 360 mg/24 hours oral capsule, extended release: 1 cap(s) orally once a day  lisinopril 10 mg oral tablet: 1 tab(s) orally once a day  senna leaf extract oral tablet: 2 tab(s) orally once a day (at bedtime)  tamsulosin 0.4 mg oral capsule: 1 cap(s) orally once a day (at bedtime)   atorvastatin 10 mg oral tablet: 1 tab(s) orally once a day  DilTIAZem (Eqv-Cardizem CD) 360 mg/24 hours oral capsule, extended release: 1 cap(s) orally once a day  lisinopril 10 mg oral tablet: 1 tab(s) orally once a day  nystatin 100,000 units/g topical powder: 1 application topically 2 times a day  senna leaf extract oral tablet: 2 tab(s) orally once a day (at bedtime)  sulfamethoxazole-trimethoprim 800 mg-160 mg oral tablet: 1 tab(s) orally every 12 hours  tamsulosin 0.4 mg oral capsule: 1 cap(s) orally once a day (at bedtime)

## 2022-08-18 NOTE — PROGRESS NOTE ADULT - SUBJECTIVE AND OBJECTIVE BOX
ANG MCCAIN  ----------------------------------------  The patient was seen at bedside. Patient with an episode of fall. Offered no complaints.    Vital Signs Last 24 Hrs  T(C): 36.6 (18 Aug 2022 12:29), Max: 36.7 (17 Aug 2022 17:03)  T(F): 97.8 (18 Aug 2022 12:29), Max: 98 (17 Aug 2022 17:03)  HR: 84 (18 Aug 2022 12:29) (78 - 87)  BP: 124/85 (18 Aug 2022 12:29) (122/76 - 155/82)  BP(mean): --  RR: 20 (18 Aug 2022 12:29) (18 - 21)  SpO2: 97% (18 Aug 2022 12:29) (92% - 97%)    Parameters below as of 18 Aug 2022 12:29  Patient On (Oxygen Delivery Method): room air    PHYSICAL EXAMINATION:  ----------------------------------------  General appearance: No acute distress, Awake, Alert  HEENT: Normocephalic, Conjunctiva clear, EOMI, Periorbital ecchymosis  Neck: Supple, No JVD, No tenderness  Lungs: Breath sound equal bilaterally, No wheezes, No rales  Cardiovascular: S1S2, Regular rhythm  Abdomen: Soft, Nontender, Nondistended, No guarding/rebound, Positive bowel sounds  Extremities: No clubbing, No cyanosis, No edema, No calf tenderness, Right elbow dressing clean and intact, Abrasions to the right shoulder and knees  Neuro: Strength equal bilaterally, No tremors  Psychiatric: Appropriate mood, Normal affect    LABORATORY STUDIES:  ----------------------------------------  08-17    139  |  107  |  34.6<H>  ----------------------------<  109<H>  4.6   |  24.0  |  0.71    Ca    8.3<L>      17 Aug 2022 05:40    CARDIAC MARKERS ( 17 Aug 2022 05:40 )  x     / x     / 794 U/L / x     / 12.0 ng/mL    MEDICATIONS  (STANDING):  atorvastatin 10 milliGRAM(s) Oral at bedtime  diltiazem    Tablet 60 milliGRAM(s) Oral every 6 hours  lisinopril 10 milliGRAM(s) Oral daily  senna 2 Tablet(s) Oral at bedtime    MEDICATIONS  (PRN):  acetaminophen     Tablet .. 650 milliGRAM(s) Oral every 6 hours PRN Temp greater or equal to 38C (100.4F), Mild Pain (1 - 3)  aluminum hydroxide/magnesium hydroxide/simethicone Suspension 30 milliLiter(s) Oral every 4 hours PRN Dyspepsia  BACItracin   Ointment 1 Application(s) Topical daily PRN for wound change  melatonin 3 milliGRAM(s) Oral at bedtime PRN Insomnia  ondansetron Injectable 4 milliGRAM(s) IV Push every 8 hours PRN Nausea and/or Vomiting      ASSESSMENT / PLAN:  ----------------------------------------  89F with a history of atrial fibrillation, Parkinson's disease, dementia, and hypertension who presented with an episode of fall at home. The patient reported that she had tripped at home but was unsure of the details.    Fall - CT of the head was without acute intracranial pathology. Xray of the knee and elbow were without acute findings. The patient was seen by Trauma Surgery and no surgical intervention was recommended at the time. Serial troponin levels were not elevated. Telemetry monitoring without significant arrhythmia other than atrial fibrillation. Echocardiogram noted preserved left ventricular systolic function without significant valvular dysfunction. Planned for further treatment at a rehabilitation facility.    Rhabdomyolysis - CPK levels improved on repeat studies.    Atrial fibrillation - On diltiazem. Warfarin held due the history of falls.    Parkinson's disease - Physical Therapy evaluation noted. Awaiting transfer to a rehabilitation facility for further treatment.    Hypertension - Close blood pressure monitoring. On diltiazem and lisinopril.    Leukocytosis - Resolved. Afebrile. Urinalysis was not indicative of infection. Chest Xray was without acute pulmonary disease.

## 2022-08-19 NOTE — DIETITIAN INITIAL EVALUATION ADULT - PERTINENT MEDS FT
MEDICATIONS  (STANDING):  atorvastatin 10 milliGRAM(s) Oral at bedtime  diltiazem    Tablet 60 milliGRAM(s) Oral every 6 hours  lisinopril 10 milliGRAM(s) Oral daily  senna 2 Tablet(s) Oral at bedtime    MEDICATIONS  (PRN):  acetaminophen     Tablet .. 650 milliGRAM(s) Oral every 6 hours PRN Temp greater or equal to 38C (100.4F), Mild Pain (1 - 3)  aluminum hydroxide/magnesium hydroxide/simethicone Suspension 30 milliLiter(s) Oral every 4 hours PRN Dyspepsia  BACItracin   Ointment 1 Application(s) Topical daily PRN for wound change  melatonin 3 milliGRAM(s) Oral at bedtime PRN Insomnia  ondansetron Injectable 4 milliGRAM(s) IV Push every 8 hours PRN Nausea and/or Vomiting

## 2022-08-19 NOTE — DIETITIAN INITIAL EVALUATION ADULT - ETIOLOGY
related to inability to meet sufficient protein-energy in setting of advanced age, Parkinson's, dementia

## 2022-08-19 NOTE — DIETITIAN INITIAL EVALUATION ADULT - OTHER INFO
89 year old female with a history of atrial fibrillation, Parkinson's disease, dementia, and hypertension who presented with an episode of fall at home. Found with Rhabdomyolysis.     Pt lethargic at time of interview, h/o dementia. Fair appetite, ate fairly well for breakfast this morning per tray observation. No weight documented currently and unable to obtain bedscale weight as pt in chair. Per EMR review, pts weight in 5/2022 was 143 lbs. Limited NFPE conducted. RD to follow up.

## 2022-08-19 NOTE — DIETITIAN INITIAL EVALUATION ADULT - NSFNSPHYEXAMSKINFT_GEN_A_CORE
Pressure Injury 1: none, Suspected deep tissue injury  Pressure Injury 2: none, none  Pressure Injury 3: none, none  Pressure Injury 4: none, none  Pressure Injury 5: none, none  Pressure Injury 6: none, none  Pressure Injury 7: none, none  Pressure Injury 8: none, none  Pressure Injury 9: none, none  Pressure Injury 10: none, none  Pressure Injury 11: none, none

## 2022-08-19 NOTE — DIETITIAN NUTRITION RISK NOTIFICATION - ADDITIONAL COMMENTS/DIETITIAN RECOMMENDATIONS
Continue diet as tolerated.   Add Ensure Enlive TID to optimize po intake and provide an additional 350 kcal, 20g protein per serving.  Rx: MVI and vitamin C 500mg daily. Encourage po intake, monitor diet tolerance, and provide assistance at meals as needed. Obtain daily weights to monitor trends.

## 2022-08-19 NOTE — CHART NOTE - NSCHARTNOTEFT_GEN_A_CORE
PA NOTE-MEDICINE    Called by RN due to Pt c/o Burning on urination   UA Ordered with straight cath    Continue to Monitor Pt   Recall PA if any changed in Pt Status   Will sign out to AM Team to Follow Up PA NOTE-MEDICINE    Called by RN due to Pt c/o Burning on urination   UA Ordered with straight cath    Bladder scan every 6 hrs  Continue to Monitor Pt   Recall PA if any changed in Pt Status   Will sign out to AM Team to Follow Up

## 2022-08-20 NOTE — PROGRESS NOTE ADULT - ASSESSMENT
89F with a history of atrial fibrillation, Parkinson's disease, dementia, and hypertension who presented with an episode of fall at home. The patient reported that she had tripped at home but was unsure of the details.    Fall - CT of the head was without acute intracranial pathology. Xray of the knee and elbow were without acute findings. The patient was seen by Trauma Surgery and no surgical intervention was recommended at the time. Serial troponin levels were not elevated. Telemetry monitoring without significant arrhythmia other than atrial fibrillation. Echocardiogram noted preserved left ventricular systolic function without significant valvular dysfunction. Planned for further treatment at a rehabilitation facility.    Urine retention   Catheterized overnight  Intermittently voiding and retaining, likely 2/2 age related weakness  UA neg  Will continue w/ intermittent catheterization for Bladder volume >300  Bladder scans Q6  c/w flomax 0.4mg QHS  Outpatient urology appt    Rhabdomyolysis   improved  will recheck CPK levels in AM for confirmation of resolution    Atrial fibrillation  stable  c/w cardizem 60mg Q6  Holding warfarin due to history of falls  monitor for RVR     Parkinson's disease   Physical Therapy evaluation noted  Accepted to [facility]  Awaiting insurance auth    Hypertension  controlled  c/w diltiazem 60mg Q6  c/w lisinopril 10mg Q24    Healthcare maintenance  DVT PPx: SCDs  Diet: DASh  Dispo: [facility] pending auth

## 2022-08-20 NOTE — PROGRESS NOTE ADULT - SUBJECTIVE AND OBJECTIVE BOX
[facility] Division of Hospital Medicine    Chief Complaint:  Traumatic Fall    SUBJECTIVE / OVERNIGHT EVENTS:  Pt required catheterization overnight, Bladder scan showed volume of 450ml. Seen at bedside, pleasant, calm, in NAD. Pt denies HA, CP, SOB, Abd pain, NVD, Fatigue, Fevers, Rashes.    MEDICATIONS  (STANDING):  atorvastatin 10 milliGRAM(s) Oral at bedtime  diltiazem    Tablet 60 milliGRAM(s) Oral every 6 hours  lisinopril 10 milliGRAM(s) Oral daily  senna 2 Tablet(s) Oral at bedtime  tamsulosin 0.4 milliGRAM(s) Oral at bedtime    MEDICATIONS  (PRN):  acetaminophen     Tablet .. 650 milliGRAM(s) Oral every 6 hours PRN Temp greater or equal to 38C (100.4F), Mild Pain (1 - 3)  aluminum hydroxide/magnesium hydroxide/simethicone Suspension 30 milliLiter(s) Oral every 4 hours PRN Dyspepsia  BACItracin   Ointment 1 Application(s) Topical daily PRN for wound change  melatonin 3 milliGRAM(s) Oral at bedtime PRN Insomnia  ondansetron Injectable 4 milliGRAM(s) IV Push every 8 hours PRN Nausea and/or Vomiting        I&O's Summary    19 Aug 2022 07:01  -  20 Aug 2022 07:00  --------------------------------------------------------  IN: 0 mL / OUT: 1020 mL / NET: -1020 mL        PHYSICAL EXAM:  Vital Signs Last 24 Hrs  T(C): 36.5 (20 Aug 2022 11:38), Max: 36.9 (20 Aug 2022 05:17)  T(F): 97.7 (20 Aug 2022 11:38), Max: 98.4 (20 Aug 2022 05:17)  HR: 80 (20 Aug 2022 11:38) (80 - 88)  BP: 106/64 (20 Aug 2022 11:43) (106/64 - 127/68)  BP(mean): --  RR: 18 (20 Aug 2022 11:38) (18 - 18)  SpO2: 94% (20 Aug 2022 11:38) (94% - 95%)    Parameters below as of 20 Aug 2022 11:38  Patient On (Oxygen Delivery Method): room air      General appearance: No acute distress, Awake, Alert  HEENT: Normocephalic, Conjunctiva clear, EOMI, Periorbital ecchymosis  Neck: Supple, No JVD, No tenderness  Lungs: Breath sound equal bilaterally, No wheezes, No rales  Cardiovascular: S1S2, Regular rhythm  Abdomen: Soft, Nontender, Nondistended, No guarding/rebound, Positive bowel sounds  Extremities: No clubbing, No cyanosis, No edema, No calf tenderness, Right elbow dressing clean and intact, Abrasions to the right shoulder and knees  Neuro: Strength equal bilaterally, No tremors  Psychiatric: Appropriate mood, Normal affect    LABS:                Urinalysis Basic - ( 19 Aug 2022 03:40 )    Color: Yellow / Appearance: Clear / S.020 / pH: x  Gluc: x / Ketone: Negative  / Bili: Negative / Urobili: Negative mg/dL   Blood: x / Protein: Negative / Nitrite: Negative   Leuk Esterase: Negative / RBC: x / WBC x   Sq Epi: x / Non Sq Epi: x / Bacteria: x        CAPILLARY BLOOD GLUCOSE            RADIOLOGY & ADDITIONAL TESTS:   Results Reviewed:  Y  Imaging Personally Reviewed: N  Electrocardiogram Personally Reviewed: DA

## 2022-08-21 NOTE — PROGRESS NOTE ADULT - ASSESSMENT
89F with a history of atrial fibrillation, Parkinson's disease, dementia, and hypertension who presented with an episode of fall at home. The patient reported that she had tripped at home but was unsure of the details.    Fall - CT of the head was without acute intracranial pathology. Xray of the knee and elbow were without acute findings. The patient was seen by Trauma Surgery and no surgical intervention was recommended at the time. Serial troponin levels were not elevated. Telemetry monitoring without significant arrhythmia other than atrial fibrillation. Echocardiogram noted preserved left ventricular systolic function without significant valvular dysfunction. Planned for further treatment at a rehabilitation facility.    Urine retention   urinated on own overnight  Intermittently voiding and retaining, likely 2/2 age related weakness  UA neg  Will continue w/ intermittent catheterization for Bladder volume >300  Bladder scans Q6  c/w flomax 0.4mg QHS  Outpatient urology appt    Rhabdomyolysis   Resolved    Atrial fibrillation  stable  c/w cardizem 60mg Q6  Holding warfarin due to history of falls  monitor for RVR     Parkinson's disease   Physical Therapy evaluation noted  Accepted to [facility]  Awaiting insurance auth    Hypertension  controlled  c/w diltiazem 60mg Q6  c/w lisinopril 10mg Q24    Healthcare maintenance  DVT PPx: SCDs  Diet: DASH  Dispo: [facility] pending auth

## 2022-08-21 NOTE — PROGRESS NOTE ADULT - SUBJECTIVE AND OBJECTIVE BOX
[facility] Division of Hospital Medicine    Chief Complaint:  Traumatic Fall    SUBJECTIVE / OVERNIGHT EVENTS:  Pt urinated on her own overnight. Seen at bedside, pleasant, calm, in NAD. Pt denies HA, CP, SOB, Abd pain, NVD, Fatigue, Fevers, Rashes.    MEDICATIONS  (STANDING):  atorvastatin 10 milliGRAM(s) Oral at bedtime  diltiazem    Tablet 60 milliGRAM(s) Oral every 6 hours  lisinopril 10 milliGRAM(s) Oral daily  nystatin Powder 1 Application(s) Topical two times a day  senna 2 Tablet(s) Oral at bedtime  tamsulosin 0.4 milliGRAM(s) Oral at bedtime    MEDICATIONS  (PRN):  acetaminophen     Tablet .. 650 milliGRAM(s) Oral every 6 hours PRN Temp greater or equal to 38C (100.4F), Mild Pain (1 - 3)  aluminum hydroxide/magnesium hydroxide/simethicone Suspension 30 milliLiter(s) Oral every 4 hours PRN Dyspepsia  BACItracin   Ointment 1 Application(s) Topical daily PRN for wound change  melatonin 3 milliGRAM(s) Oral at bedtime PRN Insomnia  ondansetron Injectable 4 milliGRAM(s) IV Push every 8 hours PRN Nausea and/or Vomiting        I&O's Summary    20 Aug 2022 07:01  -  21 Aug 2022 07:00  --------------------------------------------------------  IN: 0 mL / OUT: 300 mL / NET: -300 mL        PHYSICAL EXAM:  Vital Signs Last 24 Hrs  T(C): 36.4 (21 Aug 2022 08:53), Max: 37.3 (20 Aug 2022 16:30)  T(F): 97.5 (21 Aug 2022 08:53), Max: 99.2 (20 Aug 2022 16:30)  HR: 61 (21 Aug 2022 08:53) (61 - 87)  BP: 110/56 (21 Aug 2022 08:53) (106/64 - 125/55)  BP(mean): --  RR: 18 (21 Aug 2022 08:53) (18 - 18)  SpO2: 94% (21 Aug 2022 08:53) (94% - 94%)    Parameters below as of 21 Aug 2022 04:00  Patient On (Oxygen Delivery Method): room air      General appearance: No acute distress, Awake, Alert  HEENT: Normocephalic, Conjunctiva clear, EOMI, Periorbital ecchymosis  Neck: Supple, No JVD, No tenderness  Lungs: Breath sound equal bilaterally, No wheezes, No rales  Cardiovascular: S1S2, Regular rhythm  Abdomen: Soft, Nontender, Nondistended, No guarding/rebound, Positive bowel sounds  Extremities: No clubbing, No cyanosis, No edema, No calf tenderness, Right elbow dressing clean and intact, Abrasions to the right shoulder and knees  Neuro: Strength equal bilaterally, No tremors  Psychiatric: Appropriate mood, Normal affect      LABS:            CARDIAC MARKERS ( 21 Aug 2022 04:50 )  x     / x     / 85 U/L / x     / x              CAPILLARY BLOOD GLUCOSE            RADIOLOGY & ADDITIONAL TESTS:  Results Reviewed: Y  Imaging Personally Reviewed: N  Electrocardiogram Personally Reviewed: DA

## 2022-08-22 NOTE — DISCHARGE NOTE NURSING/CASE MANAGEMENT/SOCIAL WORK - NSDCPEFALRISK_GEN_ALL_CORE
For information on Fall & Injury Prevention, visit: [email or web address] OR  [email or web address] OR  https://www.cdc.gov/steadi/patient.html

## 2022-08-22 NOTE — PROGRESS NOTE ADULT - REASON FOR ADMISSION
traumatic fall

## 2022-08-22 NOTE — PROGRESS NOTE ADULT - ASSESSMENT
89F with a history of atrial fibrillation, Parkinson's disease, dementia, and hypertension who presented with an episode of fall at home. The patient reported that she had tripped at home but was unsure of the details.    Fall - CT of the head was without acute intracranial pathology. Xray of the knee and elbow were without acute findings. The patient was seen by Trauma Surgery and no surgical intervention was recommended at the time. Serial troponin levels were not elevated. Telemetry monitoring without significant arrhythmia other than atrial fibrillation. Echocardiogram noted preserved left ventricular systolic function without significant valvular dysfunction. Planned for further treatment at a rehabilitation facility.    UTI  new symptoms of dysuria  UA positive  start bactrim DS Q12 PO for 7 days  f/u w/ urology and PMD outpatient    Urine retention   Resolved  Pt voiding on own  c/w flomax 0.4mg QHS  Outpatient urology appt    Atrial fibrillation  stable  c/w cardizem 60mg Q6  Holding warfarin due to history of falls  monitor for RVR     Parkinson's disease   Physical Therapy evaluation noted  Accepted to [facility]  Awaiting insurance auth    Hypertension  controlled  c/w diltiazem 60mg Q6  c/w lisinopril 10mg Q24    Healthcare maintenance  DVT PPx: SCDs  Diet: DASH  Dispo: [facility] pending auth

## 2022-08-22 NOTE — PROGRESS NOTE ADULT - NS ATTEST RISK PROBLEM GEN_ALL_CORE FT
New PO Abx ordered for new UTI
Intermittent catheterization for urinary retention, close lab monitoring
Intermittent catheterization for urinary retention, close lab monitoring

## 2022-08-22 NOTE — DISCHARGE NOTE NURSING/CASE MANAGEMENT/SOCIAL WORK - PATIENT PORTAL LINK FT
You can access the FollowMyHealth Patient Portal offered by [facility] by registering at the following website: [email or web address]. By joining [facility]’s FollowMyHealth portal, you will also be able to view your health information using other applications (apps) compatible with our system.

## 2022-08-22 NOTE — PROGRESS NOTE ADULT - SUBJECTIVE AND OBJECTIVE BOX
[facility] Division of Hospital Medicine    Chief Complaint:  Traumatic Fall    SUBJECTIVE / OVERNIGHT EVENTS:  Pt urinated on her own overnight. Seen at bedside, pleasant, calm, complaining of mild burning on urination. Pt denies HA, CP, SOB, Abd pain, NVD, Fatigue, Fevers, Rashes. Remaining ROS neg other than mentioned.    MEDICATIONS  (STANDING):  atorvastatin 10 milliGRAM(s) Oral at bedtime  diltiazem    Tablet 60 milliGRAM(s) Oral every 6 hours  lisinopril 10 milliGRAM(s) Oral daily  nystatin Powder 1 Application(s) Topical two times a day  senna 2 Tablet(s) Oral at bedtime  tamsulosin 0.4 milliGRAM(s) Oral at bedtime  trimethoprim  160 mG/sulfamethoxazole 800 mG 1 Tablet(s) Oral every 12 hours    MEDICATIONS  (PRN):  acetaminophen     Tablet .. 650 milliGRAM(s) Oral every 6 hours PRN Temp greater or equal to 38C (100.4F), Mild Pain (1 - 3)  aluminum hydroxide/magnesium hydroxide/simethicone Suspension 30 milliLiter(s) Oral every 4 hours PRN Dyspepsia  BACItracin   Ointment 1 Application(s) Topical daily PRN for wound change  melatonin 3 milliGRAM(s) Oral at bedtime PRN Insomnia  ondansetron Injectable 4 milliGRAM(s) IV Push every 8 hours PRN Nausea and/or Vomiting        I&O's Summary      PHYSICAL EXAM:  Vital Signs Last 24 Hrs  T(C): 37 (22 Aug 2022 09:52), Max: 37 (22 Aug 2022 09:52)  T(F): 98.6 (22 Aug 2022 09:52), Max: 98.6 (22 Aug 2022 09:52)  HR: 71 (22 Aug 2022 09:52) (71 - 92)  BP: 94/56 (22 Aug 2022 09:52) (92/54 - 132/97)  BP(mean): --  RR: 16 (22 Aug 2022 09:52) (16 - 18)  SpO2: 98% (22 Aug 2022 09:52) (92% - 98%)    Parameters below as of 22 Aug 2022 09:52  Patient On (Oxygen Delivery Method): room air      General appearance: No acute distress, Awake, Alert  HEENT: Normocephalic, Conjunctiva clear, EOMI, Periorbital ecchymosis  Neck: Supple, No JVD, No tenderness  Lungs: Breath sound equal bilaterally, No wheezes, No rales  Cardiovascular: S1S2, Regular rhythm  Abdomen: Soft, Nontender, Nondistended, No guarding/rebound, Positive bowel sounds  Extremities: No clubbing, No cyanosis, No edema, No calf tenderness, Right elbow dressing clean and intact, Abrasions to the right shoulder and knees  Neuro: Strength equal bilaterally, No tremors  Psychiatric: Appropriate mood, Normal affect    LABS:                        13.1   9.19  )-----------( 219      ( 22 Aug 2022 06:00 )             40.7     08-    137  |  101  |  29.6<H>  ----------------------------<  100<H>  4.5   |  26.0  |  0.72    Ca    9.0      22 Aug 2022 06:00        CARDIAC MARKERS ( 21 Aug 2022 04:50 )  x     / x     / 85 U/L / x     / x          Urinalysis Basic - ( 22 Aug 2022 09:54 )    Color: Yellow / Appearance: Slightly Turbid / S.010 / pH: x  Gluc: x / Ketone: Negative  / Bili: Negative / Urobili: Negative mg/dL   Blood: x / Protein: 15 / Nitrite: Negative   Leuk Esterase: Moderate / RBC: 11-25 /HPF / WBC 26-50 /HPF   Sq Epi: x / Non Sq Epi: Occasional / Bacteria: Moderate        CAPILLARY BLOOD GLUCOSE            RADIOLOGY & ADDITIONAL TESTS:  Results Reviewed: Y  Imaging Personally Reviewed: N  Electrocardiogram Personally Reviewed: DA

## 2023-01-01 ENCOUNTER — TRANSCRIPTION ENCOUNTER (OUTPATIENT)
Age: 88
End: 2023-01-01

## 2023-01-01 ENCOUNTER — INPATIENT (INPATIENT)
Facility: HOSPITAL | Age: 88
LOS: 11 days | Discharge: NURSING, REHAB OR LONG-TERM CARE | DRG: 640 | End: 2023-03-02
Admitting: HOSPITALIST
Payer: MEDICARE

## 2023-01-01 ENCOUNTER — APPOINTMENT (OUTPATIENT)
Dept: DERMATOLOGY | Facility: CLINIC | Age: 88
End: 2023-01-01

## 2023-01-01 VITALS
OXYGEN SATURATION: 98 % | RESPIRATION RATE: 20 BRPM | WEIGHT: 136.03 LBS | DIASTOLIC BLOOD PRESSURE: 125 MMHG | TEMPERATURE: 98 F | HEART RATE: 73 BPM | HEIGHT: 64 IN | SYSTOLIC BLOOD PRESSURE: 151 MMHG

## 2023-01-01 VITALS
TEMPERATURE: 98 F | DIASTOLIC BLOOD PRESSURE: 96 MMHG | HEART RATE: 83 BPM | RESPIRATION RATE: 18 BRPM | SYSTOLIC BLOOD PRESSURE: 160 MMHG

## 2023-01-01 DIAGNOSIS — R29.6 REPEATED FALLS: ICD-10-CM
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
## 2023-01-01 DIAGNOSIS — Z98.890 OTHER SPECIFIED POSTPROCEDURAL STATES: Chronic | ICD-10-CM

## 2023-01-01 DIAGNOSIS — E86.0 DEHYDRATION: ICD-10-CM

## 2023-01-01 DIAGNOSIS — N32.81 OVERACTIVE BLADDER: ICD-10-CM

## 2023-01-01 DIAGNOSIS — I48.20 CHRONIC ATRIAL FIBRILLATION, UNSPECIFIED: ICD-10-CM

## 2023-01-01 DIAGNOSIS — R41.0 DISORIENTATION, UNSPECIFIED: ICD-10-CM

## 2023-01-01 LAB
ALBUMIN SERPL ELPH-MCNC: 3.4 G/DL — SIGNIFICANT CHANGE UP (ref 3.3–5.2)
ALP SERPL-CCNC: 137 U/L — HIGH (ref 40–120)
ALT FLD-CCNC: 24 U/L — SIGNIFICANT CHANGE UP
AMMONIA BLD-MCNC: 36 UMOL/L — SIGNIFICANT CHANGE UP (ref 11–55)
ANION GAP SERPL CALC-SCNC: 11 MMOL/L — SIGNIFICANT CHANGE UP (ref 5–17)
ANION GAP SERPL CALC-SCNC: 13 MMOL/L — SIGNIFICANT CHANGE UP (ref 5–17)
ANION GAP SERPL CALC-SCNC: 15 MMOL/L — SIGNIFICANT CHANGE UP (ref 5–17)
ANION GAP SERPL CALC-SCNC: 5 MMOL/L — SIGNIFICANT CHANGE UP (ref 5–17)
ANION GAP SERPL CALC-SCNC: 9 MMOL/L — SIGNIFICANT CHANGE UP (ref 5–17)
APPEARANCE UR: ABNORMAL
APPEARANCE UR: CLEAR — SIGNIFICANT CHANGE UP
AST SERPL-CCNC: 29 U/L — SIGNIFICANT CHANGE UP
BACTERIA # UR AUTO: ABNORMAL
BACTERIA # UR AUTO: ABNORMAL
BASE EXCESS BLDA CALC-SCNC: 4.1 MMOL/L — HIGH (ref -2–3)
BASOPHILS # BLD AUTO: 0.04 K/UL — SIGNIFICANT CHANGE UP (ref 0–0.2)
BASOPHILS # BLD AUTO: 0.04 K/UL — SIGNIFICANT CHANGE UP (ref 0–0.2)
BASOPHILS # BLD AUTO: 0.05 K/UL — SIGNIFICANT CHANGE UP (ref 0–0.2)
BASOPHILS NFR BLD AUTO: 0.6 % — SIGNIFICANT CHANGE UP (ref 0–2)
BASOPHILS NFR BLD AUTO: 0.8 % — SIGNIFICANT CHANGE UP (ref 0–2)
BASOPHILS NFR BLD AUTO: 0.9 % — SIGNIFICANT CHANGE UP (ref 0–2)
BILIRUB SERPL-MCNC: 0.5 MG/DL — SIGNIFICANT CHANGE UP (ref 0.4–2)
BILIRUB UR-MCNC: NEGATIVE — SIGNIFICANT CHANGE UP
BILIRUB UR-MCNC: NEGATIVE — SIGNIFICANT CHANGE UP
BUN SERPL-MCNC: 22.1 MG/DL — HIGH (ref 8–20)
BUN SERPL-MCNC: 22.5 MG/DL — HIGH (ref 8–20)
BUN SERPL-MCNC: 30.7 MG/DL — HIGH (ref 8–20)
BUN SERPL-MCNC: 31.8 MG/DL — HIGH (ref 8–20)
BUN SERPL-MCNC: 33.2 MG/DL — HIGH (ref 8–20)
CALCIUM SERPL-MCNC: 8.6 MG/DL — SIGNIFICANT CHANGE UP (ref 8.4–10.5)
CALCIUM SERPL-MCNC: 8.7 MG/DL — SIGNIFICANT CHANGE UP (ref 8.4–10.5)
CALCIUM SERPL-MCNC: 9 MG/DL — SIGNIFICANT CHANGE UP (ref 8.4–10.5)
CALCIUM SERPL-MCNC: 9.3 MG/DL — SIGNIFICANT CHANGE UP (ref 8.4–10.5)
CALCIUM SERPL-MCNC: 9.5 MG/DL — SIGNIFICANT CHANGE UP (ref 8.4–10.5)
CHLORIDE SERPL-SCNC: 103 MMOL/L — SIGNIFICANT CHANGE UP (ref 96–108)
CHLORIDE SERPL-SCNC: 107 MMOL/L — SIGNIFICANT CHANGE UP (ref 96–108)
CHLORIDE SERPL-SCNC: 107 MMOL/L — SIGNIFICANT CHANGE UP (ref 96–108)
CHLORIDE SERPL-SCNC: 108 MMOL/L — SIGNIFICANT CHANGE UP (ref 96–108)
CHLORIDE SERPL-SCNC: 110 MMOL/L — HIGH (ref 96–108)
CO2 SERPL-SCNC: 21 MMOL/L — LOW (ref 22–29)
CO2 SERPL-SCNC: 23 MMOL/L — SIGNIFICANT CHANGE UP (ref 22–29)
CO2 SERPL-SCNC: 23 MMOL/L — SIGNIFICANT CHANGE UP (ref 22–29)
CO2 SERPL-SCNC: 26 MMOL/L — SIGNIFICANT CHANGE UP (ref 22–29)
CO2 SERPL-SCNC: 31 MMOL/L — HIGH (ref 22–29)
COLOR SPEC: YELLOW — SIGNIFICANT CHANGE UP
COLOR SPEC: YELLOW — SIGNIFICANT CHANGE UP
CREAT SERPL-MCNC: 0.67 MG/DL — SIGNIFICANT CHANGE UP (ref 0.5–1.3)
CREAT SERPL-MCNC: 0.7 MG/DL — SIGNIFICANT CHANGE UP (ref 0.5–1.3)
CREAT SERPL-MCNC: 0.71 MG/DL — SIGNIFICANT CHANGE UP (ref 0.5–1.3)
CREAT SERPL-MCNC: 0.78 MG/DL — SIGNIFICANT CHANGE UP (ref 0.5–1.3)
CREAT SERPL-MCNC: 0.91 MG/DL — SIGNIFICANT CHANGE UP (ref 0.5–1.3)
CULTURE RESULTS: SIGNIFICANT CHANGE UP
DIFF PNL FLD: NEGATIVE — SIGNIFICANT CHANGE UP
DIFF PNL FLD: NEGATIVE — SIGNIFICANT CHANGE UP
DIGOXIN SERPL-MCNC: 0.8 NG/ML — SIGNIFICANT CHANGE UP (ref 0.8–2)
EGFR: 59 ML/MIN/1.73M2 — LOW
EGFR: 71 ML/MIN/1.73M2 — SIGNIFICANT CHANGE UP
EGFR: 80 ML/MIN/1.73M2 — SIGNIFICANT CHANGE UP
EGFR: 81 ML/MIN/1.73M2 — SIGNIFICANT CHANGE UP
EGFR: 82 ML/MIN/1.73M2 — SIGNIFICANT CHANGE UP
EOSINOPHIL # BLD AUTO: 0.03 K/UL — SIGNIFICANT CHANGE UP (ref 0–0.5)
EOSINOPHIL # BLD AUTO: 0.07 K/UL — SIGNIFICANT CHANGE UP (ref 0–0.5)
EOSINOPHIL # BLD AUTO: 0.12 K/UL — SIGNIFICANT CHANGE UP (ref 0–0.5)
EOSINOPHIL NFR BLD AUTO: 0.4 % — SIGNIFICANT CHANGE UP (ref 0–6)
EOSINOPHIL NFR BLD AUTO: 1.5 % — SIGNIFICANT CHANGE UP (ref 0–6)
EOSINOPHIL NFR BLD AUTO: 2.5 % — SIGNIFICANT CHANGE UP (ref 0–6)
EPI CELLS # UR: SIGNIFICANT CHANGE UP
EPI CELLS # UR: SIGNIFICANT CHANGE UP
GLUCOSE SERPL-MCNC: 120 MG/DL — HIGH (ref 70–99)
GLUCOSE SERPL-MCNC: 71 MG/DL — SIGNIFICANT CHANGE UP (ref 70–99)
GLUCOSE SERPL-MCNC: 85 MG/DL — SIGNIFICANT CHANGE UP (ref 70–99)
GLUCOSE SERPL-MCNC: 87 MG/DL — SIGNIFICANT CHANGE UP (ref 70–99)
GLUCOSE SERPL-MCNC: 96 MG/DL — SIGNIFICANT CHANGE UP (ref 70–99)
GLUCOSE UR QL: NEGATIVE MG/DL — SIGNIFICANT CHANGE UP
GLUCOSE UR QL: NEGATIVE MG/DL — SIGNIFICANT CHANGE UP
HCO3 BLDA-SCNC: 28 MMOL/L — SIGNIFICANT CHANGE UP (ref 21–28)
HCT VFR BLD CALC: 38.6 % — SIGNIFICANT CHANGE UP (ref 34.5–45)
HCT VFR BLD CALC: 41.5 % — SIGNIFICANT CHANGE UP (ref 34.5–45)
HCT VFR BLD CALC: 42.5 % — SIGNIFICANT CHANGE UP (ref 34.5–45)
HCT VFR BLD CALC: 53 % — HIGH (ref 34.5–45)
HGB BLD-MCNC: 12.6 G/DL — SIGNIFICANT CHANGE UP (ref 11.5–15.5)
HGB BLD-MCNC: 13.7 G/DL — SIGNIFICANT CHANGE UP (ref 11.5–15.5)
HGB BLD-MCNC: 13.7 G/DL — SIGNIFICANT CHANGE UP (ref 11.5–15.5)
HGB BLD-MCNC: 16.1 G/DL — HIGH (ref 11.5–15.5)
HOROWITZ INDEX BLDA+IHG-RTO: SIGNIFICANT CHANGE UP
IMM GRANULOCYTES NFR BLD AUTO: 0.1 % — SIGNIFICANT CHANGE UP (ref 0–0.9)
IMM GRANULOCYTES NFR BLD AUTO: 0.4 % — SIGNIFICANT CHANGE UP (ref 0–0.9)
IMM GRANULOCYTES NFR BLD AUTO: 0.6 % — SIGNIFICANT CHANGE UP (ref 0–0.9)
KETONES UR-MCNC: ABNORMAL
KETONES UR-MCNC: NEGATIVE — SIGNIFICANT CHANGE UP
LACTATE BLDV-MCNC: 1.1 MMOL/L — SIGNIFICANT CHANGE UP (ref 0.5–2)
LACTATE BLDV-MCNC: 1.3 MMOL/L — SIGNIFICANT CHANGE UP (ref 0.5–2)
LACTATE BLDV-MCNC: 2.5 MMOL/L — HIGH (ref 0.5–2)
LEUKOCYTE ESTERASE UR-ACNC: ABNORMAL
LEUKOCYTE ESTERASE UR-ACNC: NEGATIVE — SIGNIFICANT CHANGE UP
LYMPHOCYTES # BLD AUTO: 0.72 K/UL — LOW (ref 1–3.3)
LYMPHOCYTES # BLD AUTO: 0.77 K/UL — LOW (ref 1–3.3)
LYMPHOCYTES # BLD AUTO: 0.96 K/UL — LOW (ref 1–3.3)
LYMPHOCYTES # BLD AUTO: 16.6 % — SIGNIFICANT CHANGE UP (ref 13–44)
LYMPHOCYTES # BLD AUTO: 19.7 % — SIGNIFICANT CHANGE UP (ref 13–44)
LYMPHOCYTES # BLD AUTO: 8.5 % — LOW (ref 13–44)
MAGNESIUM SERPL-MCNC: 2.1 MG/DL — SIGNIFICANT CHANGE UP (ref 1.6–2.6)
MCHC RBC-ENTMCNC: 29.1 PG — SIGNIFICANT CHANGE UP (ref 27–34)
MCHC RBC-ENTMCNC: 29.9 PG — SIGNIFICANT CHANGE UP (ref 27–34)
MCHC RBC-ENTMCNC: 30 PG — SIGNIFICANT CHANGE UP (ref 27–34)
MCHC RBC-ENTMCNC: 30.2 PG — SIGNIFICANT CHANGE UP (ref 27–34)
MCHC RBC-ENTMCNC: 30.4 GM/DL — LOW (ref 32–36)
MCHC RBC-ENTMCNC: 32.2 GM/DL — SIGNIFICANT CHANGE UP (ref 32–36)
MCHC RBC-ENTMCNC: 32.6 GM/DL — SIGNIFICANT CHANGE UP (ref 32–36)
MCHC RBC-ENTMCNC: 33 GM/DL — SIGNIFICANT CHANGE UP (ref 32–36)
MCV RBC AUTO: 91.4 FL — SIGNIFICANT CHANGE UP (ref 80–100)
MCV RBC AUTO: 91.9 FL — SIGNIFICANT CHANGE UP (ref 80–100)
MCV RBC AUTO: 92.8 FL — SIGNIFICANT CHANGE UP (ref 80–100)
MCV RBC AUTO: 95.8 FL — SIGNIFICANT CHANGE UP (ref 80–100)
MONOCYTES # BLD AUTO: 0.48 K/UL — SIGNIFICANT CHANGE UP (ref 0–0.9)
MONOCYTES # BLD AUTO: 0.54 K/UL — SIGNIFICANT CHANGE UP (ref 0–0.9)
MONOCYTES # BLD AUTO: 0.92 K/UL — HIGH (ref 0–0.9)
MONOCYTES NFR BLD AUTO: 10.3 % — SIGNIFICANT CHANGE UP (ref 2–14)
MONOCYTES NFR BLD AUTO: 10.9 % — SIGNIFICANT CHANGE UP (ref 2–14)
MONOCYTES NFR BLD AUTO: 11.1 % — SIGNIFICANT CHANGE UP (ref 2–14)
NEUTROPHILS # BLD AUTO: 3.2 K/UL — SIGNIFICANT CHANGE UP (ref 1.8–7.4)
NEUTROPHILS # BLD AUTO: 3.26 K/UL — SIGNIFICANT CHANGE UP (ref 1.8–7.4)
NEUTROPHILS # BLD AUTO: 6.71 K/UL — SIGNIFICANT CHANGE UP (ref 1.8–7.4)
NEUTROPHILS NFR BLD AUTO: 65.5 % — SIGNIFICANT CHANGE UP (ref 43–77)
NEUTROPHILS NFR BLD AUTO: 70.1 % — SIGNIFICANT CHANGE UP (ref 43–77)
NEUTROPHILS NFR BLD AUTO: 79.5 % — HIGH (ref 43–77)
NITRITE UR-MCNC: NEGATIVE — SIGNIFICANT CHANGE UP
NITRITE UR-MCNC: NEGATIVE — SIGNIFICANT CHANGE UP
PCO2 BLDA: 37 MMHG — SIGNIFICANT CHANGE UP (ref 32–45)
PH BLDA: 7.48 — HIGH (ref 7.35–7.45)
PH UR: 5 — SIGNIFICANT CHANGE UP (ref 5–8)
PH UR: 6 — SIGNIFICANT CHANGE UP (ref 5–8)
PHOSPHATE SERPL-MCNC: 2.8 MG/DL — SIGNIFICANT CHANGE UP (ref 2.4–4.7)
PLATELET # BLD AUTO: 139 K/UL — LOW (ref 150–400)
PLATELET # BLD AUTO: 166 K/UL — SIGNIFICANT CHANGE UP (ref 150–400)
PLATELET # BLD AUTO: 183 K/UL — SIGNIFICANT CHANGE UP (ref 150–400)
PLATELET # BLD AUTO: 216 K/UL — SIGNIFICANT CHANGE UP (ref 150–400)
PO2 BLDA: 83 MMHG — SIGNIFICANT CHANGE UP (ref 83–108)
POTASSIUM SERPL-MCNC: 4 MMOL/L — SIGNIFICANT CHANGE UP (ref 3.5–5.3)
POTASSIUM SERPL-MCNC: 4.1 MMOL/L — SIGNIFICANT CHANGE UP (ref 3.5–5.3)
POTASSIUM SERPL-MCNC: 4.4 MMOL/L — SIGNIFICANT CHANGE UP (ref 3.5–5.3)
POTASSIUM SERPL-MCNC: 4.8 MMOL/L — SIGNIFICANT CHANGE UP (ref 3.5–5.3)
POTASSIUM SERPL-MCNC: 4.9 MMOL/L — SIGNIFICANT CHANGE UP (ref 3.5–5.3)
POTASSIUM SERPL-SCNC: 4 MMOL/L — SIGNIFICANT CHANGE UP (ref 3.5–5.3)
POTASSIUM SERPL-SCNC: 4.1 MMOL/L — SIGNIFICANT CHANGE UP (ref 3.5–5.3)
POTASSIUM SERPL-SCNC: 4.4 MMOL/L — SIGNIFICANT CHANGE UP (ref 3.5–5.3)
POTASSIUM SERPL-SCNC: 4.8 MMOL/L — SIGNIFICANT CHANGE UP (ref 3.5–5.3)
POTASSIUM SERPL-SCNC: 4.9 MMOL/L — SIGNIFICANT CHANGE UP (ref 3.5–5.3)
PROCALCITONIN SERPL-MCNC: 0.22 NG/ML — HIGH (ref 0.02–0.1)
PROCALCITONIN SERPL-MCNC: 0.65 NG/ML — HIGH (ref 0.02–0.1)
PROT SERPL-MCNC: 6.3 G/DL — LOW (ref 6.6–8.7)
PROT UR-MCNC: 15
PROT UR-MCNC: 15
RBC # BLD: 4.2 M/UL — SIGNIFICANT CHANGE UP (ref 3.8–5.2)
RBC # BLD: 4.54 M/UL — SIGNIFICANT CHANGE UP (ref 3.8–5.2)
RBC # BLD: 4.58 M/UL — SIGNIFICANT CHANGE UP (ref 3.8–5.2)
RBC # BLD: 5.53 M/UL — HIGH (ref 3.8–5.2)
RBC # FLD: 13.8 % — SIGNIFICANT CHANGE UP (ref 10.3–14.5)
RBC # FLD: 14 % — SIGNIFICANT CHANGE UP (ref 10.3–14.5)
RBC # FLD: 14.1 % — SIGNIFICANT CHANGE UP (ref 10.3–14.5)
RBC # FLD: 14.1 % — SIGNIFICANT CHANGE UP (ref 10.3–14.5)
RBC CASTS # UR COMP ASSIST: NEGATIVE /HPF — SIGNIFICANT CHANGE UP (ref 0–4)
RBC CASTS # UR COMP ASSIST: NEGATIVE /HPF — SIGNIFICANT CHANGE UP (ref 0–4)
SAO2 % BLDA: 98.1 % — HIGH (ref 94–98)
SARS-COV-2 RNA SPEC QL NAA+PROBE: SIGNIFICANT CHANGE UP
SODIUM SERPL-SCNC: 139 MMOL/L — SIGNIFICANT CHANGE UP (ref 135–145)
SODIUM SERPL-SCNC: 141 MMOL/L — SIGNIFICANT CHANGE UP (ref 135–145)
SODIUM SERPL-SCNC: 143 MMOL/L — SIGNIFICANT CHANGE UP (ref 135–145)
SODIUM SERPL-SCNC: 144 MMOL/L — SIGNIFICANT CHANGE UP (ref 135–145)
SODIUM SERPL-SCNC: 145 MMOL/L — SIGNIFICANT CHANGE UP (ref 135–145)
SP GR SPEC: 1.01 — SIGNIFICANT CHANGE UP (ref 1.01–1.02)
SP GR SPEC: 1.02 — SIGNIFICANT CHANGE UP (ref 1.01–1.02)
SPECIMEN SOURCE: SIGNIFICANT CHANGE UP
TROPONIN T SERPL-MCNC: <0.01 NG/ML — SIGNIFICANT CHANGE UP (ref 0–0.06)
TSH SERPL-MCNC: 2.19 UIU/ML — SIGNIFICANT CHANGE UP (ref 0.27–4.2)
UROBILINOGEN FLD QL: NEGATIVE MG/DL — SIGNIFICANT CHANGE UP
UROBILINOGEN FLD QL: NEGATIVE MG/DL — SIGNIFICANT CHANGE UP
VIT B12 SERPL-MCNC: 511 PG/ML — SIGNIFICANT CHANGE UP (ref 232–1245)
WBC # BLD: 4.65 K/UL — SIGNIFICANT CHANGE UP (ref 3.8–10.5)
WBC # BLD: 4.88 K/UL — SIGNIFICANT CHANGE UP (ref 3.8–10.5)
WBC # BLD: 5.17 K/UL — SIGNIFICANT CHANGE UP (ref 3.8–10.5)
WBC # BLD: 8.44 K/UL — SIGNIFICANT CHANGE UP (ref 3.8–10.5)
WBC # FLD AUTO: 4.65 K/UL — SIGNIFICANT CHANGE UP (ref 3.8–10.5)
WBC # FLD AUTO: 4.88 K/UL — SIGNIFICANT CHANGE UP (ref 3.8–10.5)
WBC # FLD AUTO: 5.17 K/UL — SIGNIFICANT CHANGE UP (ref 3.8–10.5)
WBC # FLD AUTO: 8.44 K/UL — SIGNIFICANT CHANGE UP (ref 3.8–10.5)
WBC UR QL: NEGATIVE /HPF — SIGNIFICANT CHANGE UP (ref 0–5)
WBC UR QL: SIGNIFICANT CHANGE UP /HPF (ref 0–5)

## 2023-01-01 PROCEDURE — 99239 HOSP IP/OBS DSCHRG MGMT >30: CPT

## 2023-01-01 PROCEDURE — 71045 X-RAY EXAM CHEST 1 VIEW: CPT

## 2023-01-01 PROCEDURE — 99232 SBSQ HOSP IP/OBS MODERATE 35: CPT

## 2023-01-01 PROCEDURE — 99222 1ST HOSP IP/OBS MODERATE 55: CPT

## 2023-01-01 PROCEDURE — 99233 SBSQ HOSP IP/OBS HIGH 50: CPT

## 2023-01-01 PROCEDURE — 84100 ASSAY OF PHOSPHORUS: CPT

## 2023-01-01 PROCEDURE — 72125 CT NECK SPINE W/O DYE: CPT | Mod: MA

## 2023-01-01 PROCEDURE — 99223 1ST HOSP IP/OBS HIGH 75: CPT

## 2023-01-01 PROCEDURE — 92610 EVALUATE SWALLOWING FUNCTION: CPT

## 2023-01-01 PROCEDURE — 84145 PROCALCITONIN (PCT): CPT

## 2023-01-01 PROCEDURE — 87040 BLOOD CULTURE FOR BACTERIA: CPT

## 2023-01-01 PROCEDURE — 70450 CT HEAD/BRAIN W/O DYE: CPT | Mod: 26,MA

## 2023-01-01 PROCEDURE — 81001 URINALYSIS AUTO W/SCOPE: CPT

## 2023-01-01 PROCEDURE — 83735 ASSAY OF MAGNESIUM: CPT

## 2023-01-01 PROCEDURE — U0003: CPT

## 2023-01-01 PROCEDURE — 73700 CT LOWER EXTREMITY W/O DYE: CPT | Mod: 26,LT,MA

## 2023-01-01 PROCEDURE — 82140 ASSAY OF AMMONIA: CPT

## 2023-01-01 PROCEDURE — 71250 CT THORAX DX C-: CPT | Mod: 26,MA

## 2023-01-01 PROCEDURE — 72125 CT NECK SPINE W/O DYE: CPT | Mod: 26,MA

## 2023-01-01 PROCEDURE — 80048 BASIC METABOLIC PNL TOTAL CA: CPT

## 2023-01-01 PROCEDURE — 71250 CT THORAX DX C-: CPT | Mod: MA

## 2023-01-01 PROCEDURE — 71045 X-RAY EXAM CHEST 1 VIEW: CPT | Mod: 26

## 2023-01-01 PROCEDURE — 80162 ASSAY OF DIGOXIN TOTAL: CPT

## 2023-01-01 PROCEDURE — 92526 ORAL FUNCTION THERAPY: CPT

## 2023-01-01 PROCEDURE — 82607 VITAMIN B-12: CPT

## 2023-01-01 PROCEDURE — 93005 ELECTROCARDIOGRAM TRACING: CPT

## 2023-01-01 PROCEDURE — 83605 ASSAY OF LACTIC ACID: CPT

## 2023-01-01 PROCEDURE — 99285 EMERGENCY DEPT VISIT HI MDM: CPT | Mod: 25

## 2023-01-01 PROCEDURE — 73560 X-RAY EXAM OF KNEE 1 OR 2: CPT | Mod: 26,LT

## 2023-01-01 PROCEDURE — 85025 COMPLETE CBC W/AUTO DIFF WBC: CPT

## 2023-01-01 PROCEDURE — 84484 ASSAY OF TROPONIN QUANT: CPT

## 2023-01-01 PROCEDURE — 97116 GAIT TRAINING THERAPY: CPT

## 2023-01-01 PROCEDURE — 73700 CT LOWER EXTREMITY W/O DYE: CPT | Mod: MA

## 2023-01-01 PROCEDURE — 70450 CT HEAD/BRAIN W/O DYE: CPT | Mod: MA

## 2023-01-01 PROCEDURE — 97530 THERAPEUTIC ACTIVITIES: CPT

## 2023-01-01 PROCEDURE — 84443 ASSAY THYROID STIM HORMONE: CPT

## 2023-01-01 PROCEDURE — 93010 ELECTROCARDIOGRAM REPORT: CPT

## 2023-01-01 PROCEDURE — 73560 X-RAY EXAM OF KNEE 1 OR 2: CPT

## 2023-01-01 PROCEDURE — 82803 BLOOD GASES ANY COMBINATION: CPT

## 2023-01-01 PROCEDURE — 87086 URINE CULTURE/COLONY COUNT: CPT

## 2023-01-01 PROCEDURE — U0005: CPT

## 2023-01-01 PROCEDURE — 80053 COMPREHEN METABOLIC PANEL: CPT

## 2023-01-01 PROCEDURE — 36415 COLL VENOUS BLD VENIPUNCTURE: CPT

## 2023-01-01 PROCEDURE — 85027 COMPLETE CBC AUTOMATED: CPT

## 2023-01-01 RX ORDER — OLANZAPINE 15 MG/1
2.5 TABLET, FILM COATED ORAL EVERY 6 HOURS
Refills: 0 | Status: DISCONTINUED | OUTPATIENT
Start: 2023-01-01 | End: 2023-01-01

## 2023-01-01 RX ORDER — SENNA PLUS 8.6 MG/1
2 TABLET ORAL AT BEDTIME
Refills: 0 | Status: DISCONTINUED | OUTPATIENT
Start: 2023-01-01 | End: 2023-01-01

## 2023-01-01 RX ORDER — APIXABAN 2.5 MG/1
2.5 TABLET, FILM COATED ORAL
Refills: 0 | Status: DISCONTINUED | OUTPATIENT
Start: 2023-01-01 | End: 2023-01-01

## 2023-01-01 RX ORDER — SODIUM CHLORIDE 9 MG/ML
500 INJECTION INTRAMUSCULAR; INTRAVENOUS; SUBCUTANEOUS ONCE
Refills: 0 | Status: COMPLETED | OUTPATIENT
Start: 2023-01-01 | End: 2023-01-01

## 2023-01-01 RX ORDER — SODIUM CHLORIDE 9 MG/ML
1000 INJECTION INTRAMUSCULAR; INTRAVENOUS; SUBCUTANEOUS
Refills: 0 | Status: COMPLETED | OUTPATIENT
Start: 2023-01-01 | End: 2023-01-01

## 2023-01-01 RX ORDER — QUETIAPINE FUMARATE 200 MG/1
25 TABLET, FILM COATED ORAL AT BEDTIME
Refills: 0 | Status: DISCONTINUED | OUTPATIENT
Start: 2023-01-01 | End: 2023-01-01

## 2023-01-01 RX ORDER — ATORVASTATIN CALCIUM 80 MG/1
10 TABLET, FILM COATED ORAL AT BEDTIME
Refills: 0 | Status: DISCONTINUED | OUTPATIENT
Start: 2023-01-01 | End: 2023-01-01

## 2023-01-01 RX ORDER — LANOLIN ALCOHOL/MO/W.PET/CERES
5 CREAM (GRAM) TOPICAL AT BEDTIME
Refills: 0 | Status: DISCONTINUED | OUTPATIENT
Start: 2023-01-01 | End: 2023-01-01

## 2023-01-01 RX ORDER — AZITHROMYCIN 500 MG/1
500 TABLET, FILM COATED ORAL ONCE
Refills: 0 | Status: COMPLETED | OUTPATIENT
Start: 2023-01-01 | End: 2023-01-01

## 2023-01-01 RX ORDER — OLANZAPINE 15 MG/1
2.5 TABLET, FILM COATED ORAL ONCE
Refills: 0 | Status: COMPLETED | OUTPATIENT
Start: 2023-01-01 | End: 2023-01-01

## 2023-01-01 RX ORDER — SODIUM CHLORIDE 9 MG/ML
1000 INJECTION INTRAMUSCULAR; INTRAVENOUS; SUBCUTANEOUS
Refills: 0 | Status: DISCONTINUED | OUTPATIENT
Start: 2023-01-01 | End: 2023-01-01

## 2023-01-01 RX ORDER — QUETIAPINE FUMARATE 200 MG/1
1 TABLET, FILM COATED ORAL
Qty: 0 | Refills: 0 | DISCHARGE
Start: 2023-01-01

## 2023-01-01 RX ORDER — ACETAMINOPHEN 500 MG
650 TABLET ORAL EVERY 6 HOURS
Refills: 0 | Status: DISCONTINUED | OUTPATIENT
Start: 2023-01-01 | End: 2023-01-01

## 2023-01-01 RX ORDER — DIGOXIN 250 MCG
250 TABLET ORAL DAILY
Refills: 0 | Status: DISCONTINUED | OUTPATIENT
Start: 2023-01-01 | End: 2023-01-01

## 2023-01-01 RX ORDER — CEFTRIAXONE 500 MG/1
1000 INJECTION, POWDER, FOR SOLUTION INTRAMUSCULAR; INTRAVENOUS ONCE
Refills: 0 | Status: DISCONTINUED | OUTPATIENT
Start: 2023-01-01 | End: 2023-01-01

## 2023-01-01 RX ORDER — QUETIAPINE FUMARATE 200 MG/1
25 TABLET, FILM COATED ORAL EVERY 8 HOURS
Refills: 0 | Status: DISCONTINUED | OUTPATIENT
Start: 2023-01-01 | End: 2023-01-01

## 2023-01-01 RX ORDER — APIXABAN 2.5 MG/1
1 TABLET, FILM COATED ORAL
Qty: 0 | Refills: 0 | DISCHARGE

## 2023-01-01 RX ORDER — LISINOPRIL 2.5 MG/1
10 TABLET ORAL DAILY
Refills: 0 | Status: DISCONTINUED | OUTPATIENT
Start: 2023-01-01 | End: 2023-01-01

## 2023-01-01 RX ORDER — OXYBUTYNIN CHLORIDE 5 MG
1 TABLET ORAL
Qty: 0 | Refills: 0 | DISCHARGE

## 2023-01-01 RX ORDER — QUETIAPINE FUMARATE 200 MG/1
12.5 TABLET, FILM COATED ORAL AT BEDTIME
Refills: 0 | Status: DISCONTINUED | OUTPATIENT
Start: 2023-01-01 | End: 2023-01-01

## 2023-01-01 RX ORDER — CEFTRIAXONE 500 MG/1
1000 INJECTION, POWDER, FOR SOLUTION INTRAMUSCULAR; INTRAVENOUS EVERY 24 HOURS
Refills: 0 | Status: DISCONTINUED | OUTPATIENT
Start: 2023-01-01 | End: 2023-01-01

## 2023-01-01 RX ORDER — OXYBUTYNIN CHLORIDE 5 MG
10 TABLET ORAL DAILY
Refills: 0 | Status: DISCONTINUED | OUTPATIENT
Start: 2023-01-01 | End: 2023-01-01

## 2023-01-01 RX ORDER — DILTIAZEM HCL 120 MG
1 CAPSULE, EXT RELEASE 24 HR ORAL
Qty: 0 | Refills: 0 | DISCHARGE

## 2023-01-01 RX ORDER — DIGOXIN 250 MCG
1 TABLET ORAL
Qty: 0 | Refills: 0 | DISCHARGE

## 2023-01-01 RX ORDER — CEFTRIAXONE 500 MG/1
1000 INJECTION, POWDER, FOR SOLUTION INTRAMUSCULAR; INTRAVENOUS ONCE
Refills: 0 | Status: COMPLETED | OUTPATIENT
Start: 2023-01-01 | End: 2023-01-01

## 2023-01-01 RX ORDER — ACETAMINOPHEN 500 MG
1000 TABLET ORAL ONCE
Refills: 0 | Status: COMPLETED | OUTPATIENT
Start: 2023-01-01 | End: 2023-01-01

## 2023-01-01 RX ORDER — POLYETHYLENE GLYCOL 3350 17 G/17G
17 POWDER, FOR SOLUTION ORAL DAILY
Refills: 0 | Status: DISCONTINUED | OUTPATIENT
Start: 2023-01-01 | End: 2023-01-01

## 2023-01-01 RX ADMIN — Medication 250 MICROGRAM(S): at 05:51

## 2023-01-01 RX ADMIN — SODIUM CHLORIDE 70 MILLILITER(S): 9 INJECTION INTRAMUSCULAR; INTRAVENOUS; SUBCUTANEOUS at 18:39

## 2023-01-01 RX ADMIN — SENNA PLUS 2 TABLET(S): 8.6 TABLET ORAL at 21:42

## 2023-01-01 RX ADMIN — Medication 10 MILLIGRAM(S): at 18:27

## 2023-01-01 RX ADMIN — APIXABAN 2.5 MILLIGRAM(S): 2.5 TABLET, FILM COATED ORAL at 06:46

## 2023-01-01 RX ADMIN — SENNA PLUS 2 TABLET(S): 8.6 TABLET ORAL at 22:33

## 2023-01-01 RX ADMIN — Medication 250 MICROGRAM(S): at 05:45

## 2023-01-01 RX ADMIN — ATORVASTATIN CALCIUM 10 MILLIGRAM(S): 80 TABLET, FILM COATED ORAL at 21:11

## 2023-01-01 RX ADMIN — POLYETHYLENE GLYCOL 3350 17 GRAM(S): 17 POWDER, FOR SOLUTION ORAL at 11:43

## 2023-01-01 RX ADMIN — Medication 650 MILLIGRAM(S): at 20:04

## 2023-01-01 RX ADMIN — APIXABAN 2.5 MILLIGRAM(S): 2.5 TABLET, FILM COATED ORAL at 05:30

## 2023-01-01 RX ADMIN — APIXABAN 2.5 MILLIGRAM(S): 2.5 TABLET, FILM COATED ORAL at 05:43

## 2023-01-01 RX ADMIN — CEFTRIAXONE 1000 MILLIGRAM(S): 500 INJECTION, POWDER, FOR SOLUTION INTRAMUSCULAR; INTRAVENOUS at 12:15

## 2023-01-01 RX ADMIN — APIXABAN 2.5 MILLIGRAM(S): 2.5 TABLET, FILM COATED ORAL at 05:20

## 2023-01-01 RX ADMIN — LISINOPRIL 10 MILLIGRAM(S): 2.5 TABLET ORAL at 05:51

## 2023-01-01 RX ADMIN — SODIUM CHLORIDE 500 MILLILITER(S): 9 INJECTION INTRAMUSCULAR; INTRAVENOUS; SUBCUTANEOUS at 01:38

## 2023-01-01 RX ADMIN — LISINOPRIL 10 MILLIGRAM(S): 2.5 TABLET ORAL at 06:37

## 2023-01-01 RX ADMIN — SENNA PLUS 2 TABLET(S): 8.6 TABLET ORAL at 21:58

## 2023-01-01 RX ADMIN — POLYETHYLENE GLYCOL 3350 17 GRAM(S): 17 POWDER, FOR SOLUTION ORAL at 11:12

## 2023-01-01 RX ADMIN — Medication 5 MILLIGRAM(S): at 01:02

## 2023-01-01 RX ADMIN — ATORVASTATIN CALCIUM 10 MILLIGRAM(S): 80 TABLET, FILM COATED ORAL at 21:21

## 2023-01-01 RX ADMIN — POLYETHYLENE GLYCOL 3350 17 GRAM(S): 17 POWDER, FOR SOLUTION ORAL at 11:20

## 2023-01-01 RX ADMIN — POLYETHYLENE GLYCOL 3350 17 GRAM(S): 17 POWDER, FOR SOLUTION ORAL at 11:59

## 2023-01-01 RX ADMIN — CEFTRIAXONE 1000 MILLIGRAM(S): 500 INJECTION, POWDER, FOR SOLUTION INTRAMUSCULAR; INTRAVENOUS at 11:54

## 2023-01-01 RX ADMIN — Medication 5 MILLIGRAM(S): at 21:58

## 2023-01-01 RX ADMIN — LISINOPRIL 10 MILLIGRAM(S): 2.5 TABLET ORAL at 06:44

## 2023-01-01 RX ADMIN — SENNA PLUS 2 TABLET(S): 8.6 TABLET ORAL at 21:11

## 2023-01-01 RX ADMIN — APIXABAN 2.5 MILLIGRAM(S): 2.5 TABLET, FILM COATED ORAL at 18:27

## 2023-01-01 RX ADMIN — POLYETHYLENE GLYCOL 3350 17 GRAM(S): 17 POWDER, FOR SOLUTION ORAL at 11:41

## 2023-01-01 RX ADMIN — POLYETHYLENE GLYCOL 3350 17 GRAM(S): 17 POWDER, FOR SOLUTION ORAL at 11:56

## 2023-01-01 RX ADMIN — LISINOPRIL 10 MILLIGRAM(S): 2.5 TABLET ORAL at 06:10

## 2023-01-01 RX ADMIN — Medication 250 MICROGRAM(S): at 05:30

## 2023-01-01 RX ADMIN — SENNA PLUS 2 TABLET(S): 8.6 TABLET ORAL at 22:52

## 2023-01-01 RX ADMIN — APIXABAN 2.5 MILLIGRAM(S): 2.5 TABLET, FILM COATED ORAL at 17:15

## 2023-01-01 RX ADMIN — APIXABAN 2.5 MILLIGRAM(S): 2.5 TABLET, FILM COATED ORAL at 05:45

## 2023-01-01 RX ADMIN — Medication 250 MICROGRAM(S): at 06:46

## 2023-01-01 RX ADMIN — ATORVASTATIN CALCIUM 10 MILLIGRAM(S): 80 TABLET, FILM COATED ORAL at 21:58

## 2023-01-01 RX ADMIN — QUETIAPINE FUMARATE 25 MILLIGRAM(S): 200 TABLET, FILM COATED ORAL at 21:42

## 2023-01-01 RX ADMIN — CEFTRIAXONE 1000 MILLIGRAM(S): 500 INJECTION, POWDER, FOR SOLUTION INTRAMUSCULAR; INTRAVENOUS at 11:50

## 2023-01-01 RX ADMIN — APIXABAN 2.5 MILLIGRAM(S): 2.5 TABLET, FILM COATED ORAL at 05:51

## 2023-01-01 RX ADMIN — POLYETHYLENE GLYCOL 3350 17 GRAM(S): 17 POWDER, FOR SOLUTION ORAL at 12:51

## 2023-01-01 RX ADMIN — Medication 250 MICROGRAM(S): at 06:18

## 2023-01-01 RX ADMIN — CEFTRIAXONE 1000 MILLIGRAM(S): 500 INJECTION, POWDER, FOR SOLUTION INTRAMUSCULAR; INTRAVENOUS at 11:44

## 2023-01-01 RX ADMIN — QUETIAPINE FUMARATE 25 MILLIGRAM(S): 200 TABLET, FILM COATED ORAL at 14:47

## 2023-01-01 RX ADMIN — SENNA PLUS 2 TABLET(S): 8.6 TABLET ORAL at 23:11

## 2023-01-01 RX ADMIN — ATORVASTATIN CALCIUM 10 MILLIGRAM(S): 80 TABLET, FILM COATED ORAL at 21:39

## 2023-01-01 RX ADMIN — POLYETHYLENE GLYCOL 3350 17 GRAM(S): 17 POWDER, FOR SOLUTION ORAL at 11:10

## 2023-01-01 RX ADMIN — SODIUM CHLORIDE 83 MILLILITER(S): 9 INJECTION INTRAMUSCULAR; INTRAVENOUS; SUBCUTANEOUS at 15:56

## 2023-01-01 RX ADMIN — QUETIAPINE FUMARATE 25 MILLIGRAM(S): 200 TABLET, FILM COATED ORAL at 21:21

## 2023-01-01 RX ADMIN — Medication 650 MILLIGRAM(S): at 19:04

## 2023-01-01 RX ADMIN — LISINOPRIL 10 MILLIGRAM(S): 2.5 TABLET ORAL at 05:47

## 2023-01-01 RX ADMIN — SENNA PLUS 2 TABLET(S): 8.6 TABLET ORAL at 21:05

## 2023-01-01 RX ADMIN — APIXABAN 2.5 MILLIGRAM(S): 2.5 TABLET, FILM COATED ORAL at 06:10

## 2023-01-01 RX ADMIN — APIXABAN 2.5 MILLIGRAM(S): 2.5 TABLET, FILM COATED ORAL at 06:19

## 2023-01-01 RX ADMIN — Medication 400 MILLIGRAM(S): at 21:05

## 2023-01-01 RX ADMIN — Medication 250 MICROGRAM(S): at 06:10

## 2023-01-01 RX ADMIN — SENNA PLUS 2 TABLET(S): 8.6 TABLET ORAL at 21:39

## 2023-01-01 RX ADMIN — OLANZAPINE 2.5 MILLIGRAM(S): 15 TABLET, FILM COATED ORAL at 22:41

## 2023-01-01 RX ADMIN — QUETIAPINE FUMARATE 25 MILLIGRAM(S): 200 TABLET, FILM COATED ORAL at 21:11

## 2023-01-01 RX ADMIN — Medication 250 MICROGRAM(S): at 05:43

## 2023-01-01 RX ADMIN — QUETIAPINE FUMARATE 25 MILLIGRAM(S): 200 TABLET, FILM COATED ORAL at 21:38

## 2023-01-01 RX ADMIN — QUETIAPINE FUMARATE 25 MILLIGRAM(S): 200 TABLET, FILM COATED ORAL at 21:58

## 2023-01-01 RX ADMIN — CEFTRIAXONE 1000 MILLIGRAM(S): 500 INJECTION, POWDER, FOR SOLUTION INTRAMUSCULAR; INTRAVENOUS at 11:10

## 2023-01-01 RX ADMIN — Medication 10 MILLIGRAM(S): at 11:36

## 2023-01-01 RX ADMIN — ATORVASTATIN CALCIUM 10 MILLIGRAM(S): 80 TABLET, FILM COATED ORAL at 22:52

## 2023-01-01 RX ADMIN — QUETIAPINE FUMARATE 25 MILLIGRAM(S): 200 TABLET, FILM COATED ORAL at 21:05

## 2023-01-01 RX ADMIN — Medication 250 MICROGRAM(S): at 06:22

## 2023-01-01 RX ADMIN — LISINOPRIL 10 MILLIGRAM(S): 2.5 TABLET ORAL at 05:30

## 2023-01-01 RX ADMIN — Medication 5 MILLIGRAM(S): at 00:30

## 2023-01-01 RX ADMIN — LISINOPRIL 10 MILLIGRAM(S): 2.5 TABLET ORAL at 06:21

## 2023-01-01 RX ADMIN — Medication 250 MICROGRAM(S): at 06:37

## 2023-01-01 RX ADMIN — APIXABAN 2.5 MILLIGRAM(S): 2.5 TABLET, FILM COATED ORAL at 05:50

## 2023-01-01 RX ADMIN — APIXABAN 2.5 MILLIGRAM(S): 2.5 TABLET, FILM COATED ORAL at 06:22

## 2023-01-01 RX ADMIN — Medication 5 MILLIGRAM(S): at 01:51

## 2023-01-01 RX ADMIN — APIXABAN 2.5 MILLIGRAM(S): 2.5 TABLET, FILM COATED ORAL at 06:44

## 2023-01-01 RX ADMIN — APIXABAN 2.5 MILLIGRAM(S): 2.5 TABLET, FILM COATED ORAL at 17:38

## 2023-01-01 RX ADMIN — LISINOPRIL 10 MILLIGRAM(S): 2.5 TABLET ORAL at 05:44

## 2023-01-01 RX ADMIN — ATORVASTATIN CALCIUM 10 MILLIGRAM(S): 80 TABLET, FILM COATED ORAL at 21:01

## 2023-01-01 RX ADMIN — SENNA PLUS 2 TABLET(S): 8.6 TABLET ORAL at 21:02

## 2023-01-01 RX ADMIN — Medication 0.5 MILLIGRAM(S): at 01:38

## 2023-01-01 RX ADMIN — QUETIAPINE FUMARATE 25 MILLIGRAM(S): 200 TABLET, FILM COATED ORAL at 23:12

## 2023-01-01 RX ADMIN — QUETIAPINE FUMARATE 25 MILLIGRAM(S): 200 TABLET, FILM COATED ORAL at 15:31

## 2023-01-01 RX ADMIN — QUETIAPINE FUMARATE 25 MILLIGRAM(S): 200 TABLET, FILM COATED ORAL at 22:52

## 2023-01-01 RX ADMIN — OLANZAPINE 2.5 MILLIGRAM(S): 15 TABLET, FILM COATED ORAL at 23:00

## 2023-01-01 RX ADMIN — APIXABAN 2.5 MILLIGRAM(S): 2.5 TABLET, FILM COATED ORAL at 17:27

## 2023-01-01 RX ADMIN — APIXABAN 2.5 MILLIGRAM(S): 2.5 TABLET, FILM COATED ORAL at 17:33

## 2023-01-01 RX ADMIN — SENNA PLUS 2 TABLET(S): 8.6 TABLET ORAL at 22:45

## 2023-01-01 RX ADMIN — QUETIAPINE FUMARATE 25 MILLIGRAM(S): 200 TABLET, FILM COATED ORAL at 22:33

## 2023-01-01 RX ADMIN — ATORVASTATIN CALCIUM 10 MILLIGRAM(S): 80 TABLET, FILM COATED ORAL at 21:42

## 2023-01-01 RX ADMIN — POLYETHYLENE GLYCOL 3350 17 GRAM(S): 17 POWDER, FOR SOLUTION ORAL at 13:00

## 2023-01-01 RX ADMIN — LISINOPRIL 10 MILLIGRAM(S): 2.5 TABLET ORAL at 18:27

## 2023-01-01 RX ADMIN — APIXABAN 2.5 MILLIGRAM(S): 2.5 TABLET, FILM COATED ORAL at 17:21

## 2023-01-01 RX ADMIN — APIXABAN 2.5 MILLIGRAM(S): 2.5 TABLET, FILM COATED ORAL at 18:03

## 2023-01-01 RX ADMIN — APIXABAN 2.5 MILLIGRAM(S): 2.5 TABLET, FILM COATED ORAL at 18:11

## 2023-01-01 RX ADMIN — SODIUM CHLORIDE 83 MILLILITER(S): 9 INJECTION INTRAMUSCULAR; INTRAVENOUS; SUBCUTANEOUS at 03:23

## 2023-01-01 RX ADMIN — ATORVASTATIN CALCIUM 10 MILLIGRAM(S): 80 TABLET, FILM COATED ORAL at 23:19

## 2023-01-01 RX ADMIN — QUETIAPINE FUMARATE 12.5 MILLIGRAM(S): 200 TABLET, FILM COATED ORAL at 21:01

## 2023-01-01 RX ADMIN — AZITHROMYCIN 255 MILLIGRAM(S): 500 TABLET, FILM COATED ORAL at 10:37

## 2023-01-01 RX ADMIN — Medication 250 MICROGRAM(S): at 06:44

## 2023-01-01 RX ADMIN — ATORVASTATIN CALCIUM 10 MILLIGRAM(S): 80 TABLET, FILM COATED ORAL at 23:11

## 2023-01-01 RX ADMIN — SENNA PLUS 2 TABLET(S): 8.6 TABLET ORAL at 21:21

## 2023-01-01 RX ADMIN — Medication 10 MILLIGRAM(S): at 11:27

## 2023-01-01 RX ADMIN — ATORVASTATIN CALCIUM 10 MILLIGRAM(S): 80 TABLET, FILM COATED ORAL at 21:05

## 2023-01-01 RX ADMIN — APIXABAN 2.5 MILLIGRAM(S): 2.5 TABLET, FILM COATED ORAL at 06:37

## 2023-01-01 RX ADMIN — Medication 250 MICROGRAM(S): at 05:20

## 2023-01-01 RX ADMIN — Medication 10 MILLIGRAM(S): at 12:30

## 2023-01-01 RX ADMIN — CEFTRIAXONE 1000 MILLIGRAM(S): 500 INJECTION, POWDER, FOR SOLUTION INTRAMUSCULAR; INTRAVENOUS at 13:00

## 2023-01-01 RX ADMIN — APIXABAN 2.5 MILLIGRAM(S): 2.5 TABLET, FILM COATED ORAL at 17:20

## 2023-01-01 RX ADMIN — LISINOPRIL 10 MILLIGRAM(S): 2.5 TABLET ORAL at 06:19

## 2023-01-01 RX ADMIN — LISINOPRIL 10 MILLIGRAM(S): 2.5 TABLET ORAL at 05:20

## 2023-01-01 RX ADMIN — LISINOPRIL 10 MILLIGRAM(S): 2.5 TABLET ORAL at 06:46

## 2023-01-01 RX ADMIN — Medication 1000 MILLIGRAM(S): at 22:30

## 2023-01-01 RX ADMIN — APIXABAN 2.5 MILLIGRAM(S): 2.5 TABLET, FILM COATED ORAL at 17:19

## 2023-01-01 RX ADMIN — ATORVASTATIN CALCIUM 10 MILLIGRAM(S): 80 TABLET, FILM COATED ORAL at 22:46

## 2023-01-01 RX ADMIN — APIXABAN 2.5 MILLIGRAM(S): 2.5 TABLET, FILM COATED ORAL at 17:22

## 2023-01-01 RX ADMIN — QUETIAPINE FUMARATE 25 MILLIGRAM(S): 200 TABLET, FILM COATED ORAL at 22:45

## 2023-01-01 RX ADMIN — ATORVASTATIN CALCIUM 10 MILLIGRAM(S): 80 TABLET, FILM COATED ORAL at 22:33

## 2023-02-18 NOTE — PHYSICAL THERAPY INITIAL EVALUATION ADULT - ADDITIONAL COMMENTS
states has home health aides for 2 hours a day, owns and uses a SAC, owns a RW, no stairs to negotiate

## 2023-02-18 NOTE — PROVIDER CONTACT NOTE (OTHER) - ASSESSMENT
PT ordered. chart reviewed and noted. eval complete. PA and RN aware of function. pt left as received, RN present, left knee pain pre/post session 3/10. NAD, will follow.   Goals: supine<->sit: modified I. sit<->stand: modified I, ambulation 150 feet RW distant S    D/C recommendation: at this time pt requires supervision/contact guard for ambulation and currently lives alone, expect pt to return home with home PT and supervision however since supervision does not exist pt may need subacute rehab- ongoing assessment on her function PT ordered. chart reviewed and noted. eval complete. PA and RN aware of function. pt left as received, RN present, left knee pain pre/post session 3/10. NAD, will follow.   Goals2-3 days a week, within 3 days: supine<->sit: modified I. sit<->stand: modified I, ambulation 150 feet RW distant S    D/C recommendation: at this time pt requires supervision/contact guard for ambulation and currently lives alone, expect pt to return home with home PT and supervision however since supervision does not exist pt may need subacute rehab- ongoing assessment on her function

## 2023-02-18 NOTE — ED PROVIDER NOTE - CARE PLAN
1 Principal Discharge DX:	Fall at home   Principal Discharge DX:	Recurrent falls  Secondary Diagnosis:	Dehydration  Secondary Diagnosis:	Delirium  Secondary Diagnosis:	Chronic atrial fibrillation

## 2023-02-18 NOTE — PHYSICAL THERAPY INITIAL EVALUATION ADULT - NSPTDISCHREC_GEN_A_CORE
D/C recommendation: at this time pt requires supervision/contact guard for ambulation and currently lives alone, expect pt to return home with home PT and supervision however since supervision does not exist pt may need subacute rehab- ongoing assessment on her function

## 2023-02-18 NOTE — ED CDU PROVIDER INITIAL DAY NOTE - CLINICAL SUMMARY MEDICAL DECISION MAKING FREE TEXT BOX
s/p fall, h/o Parkinsons s/p fall, h/o Parkinsons, unsteady gait, prelim flims negative for FX, PT eval, possible SW for [facility]/placement

## 2023-02-18 NOTE — ED CDU PROVIDER INITIAL DAY NOTE - OBJECTIVE STATEMENT
This is a 89 year old female with pmhx of Parkinsons here for fall, endorsing L knee pain and rib pain.  She lives alone and has no assistance, being placed into observation for SW/PT evaluation.

## 2023-02-18 NOTE — ED PROVIDER NOTE - OBJECTIVE STATEMENT
89F hx AFIB, parkinson's, HTN presents after falling at home.  Patient states lost balance sitting up from bed.  Patient was not feeling dizzy or lightheaded and denies LOC or head injury. Only complaint is left knee pain.  Patient fully ambulatory now but is concerned she could fall again. Spoke with Niece Mckenna Bal who is also concerned.  Patient has daytime aide but not at night.

## 2023-02-18 NOTE — ED PROVIDER NOTE - PHYSICAL EXAMINATION
General: NAD, Elderly appearing  HEENT: Normocephalic, atraumatic  Neck: No apparent stiffness or JVD  Pulm: Chest wall symmetric and nontender, lungs clear to ascultation   Cardiac: Regular rate and regular rhythm  Abdomen: Nontender and nondistended  Skin: Skin is warm, dry and intact without rashes or lesions.  Neuro: No motor or sensory deficits    MSK: No deformity or tenderness

## 2023-02-18 NOTE — ED PROVIDER NOTE - CLINICAL SUMMARY MEDICAL DECISION MAKING FREE TEXT BOX
EKG labs and imaging performed to evaluate the patient. Workup is unremarkable for any emergent process, however patient and family are concerned about her being home alone at night.  Placing in observation for PT and SW.
No

## 2023-02-18 NOTE — ED ADULT TRIAGE NOTE - CHIEF COMPLAINT QUOTE
BIBA from home s/p mechanical fall trying to get out of bed. Pt denies LOC/ head injury or taking blood thinners - poor historian. C/o L knee pain

## 2023-02-18 NOTE — ED CDU PROVIDER INITIAL DAY NOTE - PROGRESS NOTE DETAILS
will contact [facility] in [place] to obtain medication list will contact [facility] in [place] to obtain medication list, PT contacted seen the pt at the bed side - feeling better -  S.p fall . pending CT scan and re evaluation by PT   added ua  as well  . and fall precautions protocol

## 2023-02-18 NOTE — ED PROVIDER NOTE - ATTENDING CONTRIBUTION TO CARE
89F hx AFIB, parkinson's, HTN presents after falling at home.  Patient states lost balance sitting up from bed.  Patient was not feeling dizzy or lightheaded and denies LOC or head injury. Only complaint is left knee pain.  EKG labs and imaging performed to evaluate the patient. Workup is unremarkable for any emergent process, however patient and family are concerned about her being home alone at night.  Placing in observation for PT and SW.

## 2023-02-18 NOTE — ED CDU PROVIDER INITIAL DAY NOTE - NS ED ATTENDING STATEMENT MOD
This was a shared visit with the LAWANDA. I reviewed and verified the documentation and independently performed the documented:

## 2023-02-18 NOTE — ED CDU PROVIDER INITIAL DAY NOTE - NS ED ROS FT
No fever/chills, No photophobia/eye pain/changes in vision, No ear pain/sore throat/dysphagia, No chest pain/palpitations, no SOB/cough/wheeze/stridor, No abdominal pain, No N/V/D, no dysuria/frequency/discharge, + L knee pain, +rib pain, No neck/back pain, no rash, no changes in neurological status/function.

## 2023-02-18 NOTE — ED ADULT NURSE NOTE - OBJECTIVE STATEMENT
Patient fell this morning, pt has a history of Parkinson's used to go to PT for it but has stopped going. Pt denies any pain.

## 2023-02-18 NOTE — ED CDU PROVIDER INITIAL DAY NOTE - PHYSICAL EXAMINATION
Constitutional - well-developed; well nourished. Head - NCAT. Airway patent. Eyes - PERRL. CV - RRR. no murmur. no edema. Pulm - CTAB. Abd - soft, nt. no rebound. no guarding. Neuro - A&Ox3. strength 5/5 x4. sensation intact x4. normal gait. Skin - No rash. MSK - +L knee +bruising and hematoma, TTP along b/l ribs, normal ROM.

## 2023-02-19 NOTE — ED CDU PROVIDER DISPOSITION NOTE - NS ED ATTENDING STATEMENT MOD
This was a shared visit with the LAWANDA. I reviewed and verified the documentation and independently performed the documented: Attending with

## 2023-02-19 NOTE — PROVIDER CONTACT NOTE (OTHER) - ASSESSMENT
Pt received on stretcher, and returned with CBWR in NAD to stretcher.  Pt reports pain, however would not quantify: RN Aware. PT will follow.

## 2023-02-19 NOTE — H&P ADULT - ASSESSMENT
89 year old female with PMH HTN, AF and Parkinson's (Not on any medications) was brought in after fall.  Episodes of confusion in ER  ER work up showed CT head without any acute changes. CT C-Spine without any fracture or dislocations. CT chest with reticular opacities bilaterally. Normal WBC. Elevated BUN/SCr ration and elevated HCO3. Lactate normal. CT knee with small to moderate effusion. She was given Ceftriaxone and Azithromycin in ER.  Patient seen by PT with recommendations for Rehab       Fall, recurrent  Intermittent confusion  Dehydration  - Admit to any medical bed  - Constant observation, fall precautions  - Orthostatics  - IVF  - B12, TSH, Ammonia, ABG to rule out hypercarbia  - Delirium precautions; Quetiapine PRN  - PT recommendation for Rehab    AF, rate controlled on EKG  - Digoxin, check level  - Apixaban;    Overactive bladder  - Oxybutynin    Reticular opacities on CT chest - non specific  Patient without any symptoms, leukocytosis or fever.  Defer further antibiotics    VTE Prophylaxis  Ambulate patient  with assistance    Discussed with patient Niraj Bal.  89 year old female with PMH HTN, AF and Parkinson's (Not on any medications) was brought in after fall.  Episodes of confusion in ER  ER work up showed CT head without any acute changes. CT C-Spine without any fracture or dislocations. CT chest with reticular opacities bilaterally. Normal WBC. Elevated BUN/SCr ration and elevated HCO3. Lactate normal. CT knee with small to moderate effusion. She was given Ceftriaxone and Azithromycin in ER.  Patient seen by PT with recommendations for Rehab       Fall, recurrent  Intermittent confusion  Dehydration  - Admit to any medical bed  - Constant observation, fall precautions  - Orthostatics  - IVF  - B12, TSH, Ammonia, ABG to rule out hypercarbia  - Delirium precautions; Quetiapine PRN  - PT recommendation for Rehab    AF, rate controlled on EKG  - Digoxin, check level  - Apixaban;    Overactive bladder  - Oxybutynin    Reticular opacities on CT chest - non specific  Patient without any symptoms, leukocytosis or fever.  Defer further antibiotics    VTE Prophylaxis  - Apixaban  Ambulate patient  with assistance    Discussed with patient Niraj Bal.

## 2023-02-19 NOTE — H&P ADULT - NSICDXPASTSURGICALHX_GEN_ALL_CORE_FT
X Size Of Lesion In Cm (Optional): 1.5
PAST SURGICAL HISTORY:  S/P laparotomy     
Detail Level: Detailed

## 2023-02-19 NOTE — ED CDU PROVIDER SUBSEQUENT DAY NOTE - PHYSICAL EXAMINATION
Constitutional - well-developed; well nourished. Head - NCAT. Airway patent. Eyes - PERRL. CV - RRR. no murmur. no edema. Pulm - CTAB. Abd - soft, nt. no rebound. no guarding. Neuro - A&Ox3. strength 5/5 x4. sensation intact x4. normal gait. Skin - No rash. MSK - +L knee +bruising and hematoma, TTP along b/l ribs, normal ROM. Constitutional - well-developed; thin. Head - NCAT. Airway patent. Eyes - PERRL. CV - RRR. no murmur. no edema. Pulm - CTAB. Abd - soft, nt. no rebound. no guarding. Neuro - A&Ox3. strength 5/5 x4. sensation intact x4. normal gait. Skin - No rash. MSK - +L knee +bruising and hematoma, TTP along b/l ribs, normal ROM.

## 2023-02-19 NOTE — ED CDU PROVIDER SUBSEQUENT DAY NOTE - CLINICAL SUMMARY MEDICAL DECISION MAKING FREE TEXT BOX
s/p fall, h/o Parkinsons, unsteady gait, prelim flims negative for FX, PT eval, possible SW for DEVEN/placement.  pending re eval By PT   pending CT knee and chest S.p fall   pending UA   S.p mechanical fall

## 2023-02-19 NOTE — PROVIDER CONTACT NOTE (OTHER) - ACTION/TREATMENT ORDERED:
Pt participated in supine <>sit c min A and cues. Pt refused sit<> stand and amb despite max encouragement. Pt eaily agitated and redirected to session with repeat cues.

## 2023-02-19 NOTE — ED CDU PROVIDER DISPOSITION NOTE - ATTENDING APP SHARED VISIT CONTRIBUTION OF CARE
Pt decompensated, SI+, Confused  repeat PT eval extremely unsteady on feet, only able to sit up  will need admit

## 2023-02-19 NOTE — ED ADULT NURSE REASSESSMENT NOTE - NS ED NURSE REASSESS COMMENT FT1
Report received at 1930, care assumed,  89 year old presents to ED s/p fall.  A&Ox4  Patient with h/o parkinson's disease.  Seen by PT earlier, patient able to ambulate with walker as per PT.  Patient to be followed up with  in AM.  Resting comfortably at this time in no acute distress.  Offers no complaints at this time.
assumed care of pt at this time. pt awake and alert, offers no complaints. states she "never wants to speak to you again." pt transported to CT scan in stable condition.
pt awake and alert, confused to place and situation, making suicidal statements and appears paranoid. repeating that everyone is "drugging her and they have me now where they want me." called placed to PA. pt remains in sight of RN and in no apparent distress.
pt refused breakfast tray. PT @ bedside for eval. 1:1 remains at bedside for safety.
pt unable to complete PT evaluation, per PT, pt c/o of dizziness upon sitting up in bed.
repeat BW orders ordered, lab informed and will come draw blood.
Pt A&Ox3 feels weak when trying to walk at this time. Pt resting comfortably, no signs of distress at this time, pt is on obs for PT/SW, safety maintained, call bell in reach.

## 2023-02-19 NOTE — H&P ADULT - NSHPSOCIALHISTORY_GEN_ALL_CORE
Lives alone  has aide during day time  Uses walker and cane  Denies any smoking, alcohol or drug use  No children - but has a niece and nephew

## 2023-02-19 NOTE — H&P ADULT - NSHPPHYSICALEXAM_GEN_ALL_CORE
OBJECTIVE:  Vital Signs Last 24 Hrs  T(C): 36.7 (19 Feb 2023 11:39), Max: 36.7 (18 Feb 2023 18:59)  T(F): 98.1 (19 Feb 2023 11:39), Max: 98.1 (19 Feb 2023 10:12)  HR: 91 (19 Feb 2023 11:39) (75 - 96)  BP: 163/88 (19 Feb 2023 11:39) (146/99 - 164/78)  BP(mean): --  RR: 20 (19 Feb 2023 11:39) (18 - 20)  SpO2: 97% (19 Feb 2023 11:39) (94% - 97%)    Parameters below as of 19 Feb 2023 11:39  Patient On (Oxygen Delivery Method): room air        PHYSICAL EXAMINATION  General: Elderly female lyong on stretcher, Somewhat restless, Nurse aide at bedside  HEENT:  AT NC, EOMI  NECK:  Supple  CVS: regular rate and rhythm S1 S2  RESP:  CTAB  GI:  Midline Laparotomy scar well healed, BS+ Soft nondistended nontender   : No suprapubic tenderness  MSK:  Moves  all extremities - Painful ROM left knee with mild swelling and ecchymosis  CNS: Follows simples commands, needs constant redirection. fluent speech, strength 5/5 x4. No dysmetria. Sensation grossly intact. no tremors or rigidity  INTEG:  Left knee bruising  PSYCH:   Awake, aware or being in [facility], Feb 2023 and Vic is President. Labile mood.

## 2023-02-19 NOTE — H&P ADULT - HISTORY OF PRESENT ILLNESS
89 year old female with PMH HTN, AF and Parkinson's (Not on any medications) was brought in after fall. Patient with episodes of confusion and her story keeps on changing, Initially she stated that she slipped, later admitted to being dizzy. Denies any headache, weakness, chest pain, palpitations, cough, dyspnea, nausea, vomiting, abdominal pain, diarrhea, dysuria or frequency.  Discussed with Niece Mckenna who stated patient with fall last summer as well; apparently patient is very stubborn. Lives alone and has help during the day, but no one at night, Recurrent UTIs which is consistent with antibiotic course x 2 in January. Gets confused in Hospital.    ER work up showed CT head without any acute changes. CT C-Spine without any fracture or dislocations. CT chest with reticular opacities bilaterally. Normal WBC. Elevated BUN/SCr ration and elevated HCO3. Lactate normal. CT knee with small to moderate effusion. She was given Ceftriaxone and Azithromycin in ER.    Patient seen by PT with recommendations for Rehab

## 2023-02-19 NOTE — ED CDU PROVIDER DISPOSITION NOTE - CLINICAL COURSE
This is a 89 year old female with pmhx of Parkinsons here for fall, endorsing L knee pain and rib pain.  She lives alone and has no assistance, being placed into observation for SW/PT evaluation.  Patient developed suicidal idealations and BH contacted in the morning for evaluation.  Likely delirium, CT show opacity will cover with IV abx, add on lactate and blood cultures.  Placed on 1:1.  PT recommending DEVEN.  Plan to d/w hospitalist JAYCE Guerrier.

## 2023-02-20 NOTE — PATIENT PROFILE ADULT - FALL HARM RISK - HARM RISK INTERVENTIONS

## 2023-02-21 NOTE — CHART NOTE - NSCHARTNOTEFT_GEN_A_CORE
Notified by RN pt febrile, T 102.2 F rectal Notified by RN pt febrile, T 102.2 F rectal  VS:  HR: 72   BP: 158/82   RR: 18   SpO2: 94%  Patient On (Oxygen Delivery Method): room air Notified by RN pt febrile, T 102.2 F rectal.   Pt awake, fidgeting in bed and fiddling around with bed sheets. 1:1 at bedside. Pt not answering questions asked, pt without meaningful conversation, repeating two word phrases. No ROS obtained 2/2 pt mental status.     VS: HR: 72 BP: 158/82 RR: 18 SpO2: 94% Patient On (Oxygen Delivery Method): room air.   LUNGS: no wheezing, rhonchi, rales  CARDIAC: +S1/S2, regular rate  ABD: soft, ND, NT, no facial grimacing or guarding. + bowel sounds x4 quads    -Instructed RN to administer PRN tylenol. RN reports difficulty with administering medication to patient. Pt spit out all of medication. Ordered Ofirmev IVPB x1 dose.   - STAT CBC with diff, BMP, Lactate, procal, BC x2. Will f/u results.   - Pt stable at this time, NAD. RN instructed to continue to monitor pt and notify provider of any changes in pt status. Notified by RN pt febrile, T 102.2 F rectal.   Pt awake, fidgeting in bed and fiddling around with bed sheets. 1:1 at bedside. Pt not answering questions asked, pt without meaningful conversation, repeating two word phrases. No ROS obtained 2/2 pt mental status.     VS: HR: 72 BP: 158/82 RR: 18 SpO2: 94% Patient On (Oxygen Delivery Method): room air.   LUNGS: no wheezing, rhonchi, rales  CARDIAC: +S1/S2, regular rate  ABD: soft, ND, NT, no facial grimacing or guarding. + bowel sounds x4 quads    -Instructed RN to administer PRN tylenol. RN reports difficulty with administering medication to patient. Pt spit out all of medication. Ordered Ofirmev IVPB x1 dose.   - STAT CBC with diff, BMP, Lactate, procal, BC x2. Will f/u results.   - Pt stable at this time, NAD. RN instructed to continue to monitor pt and notify provider of any changes in pt status.    2/22 01:00 Notified by RN lab reported critical lab value lactate 2.5. No leukocytosis. Pt temp now 100.2 F Rectal. Unable to obtain remainder of VS at this time as pt is agitated, combative with staff, attempting to hit staff and get OOB. Zyprexa administered earlier without effect. Will order ativan 0.5mg IM x1 dose and reassess. Ordered 500mL sodium chloride IVF bolus x1 dose, repeat lactate 04:00AM. Repeat CBC at 04:00AM. Notified by RN pt febrile, T 102.2 F rectal.   Pt awake, fidgeting in bed and fiddling around with bed sheets. 1:1 at bedside. Pt not answering questions asked, pt without meaningful conversation, repeating two word phrases. No ROS obtained 2/2 pt mental status.     VS: HR: 72 BP: 158/82 RR: 18 SpO2: 94% Patient On (Oxygen Delivery Method): room air.   LUNGS: no wheezing, rhonchi, rales  CARDIAC: +S1/S2, regular rate  ABD: soft, ND, NT, no facial grimacing or guarding. + bowel sounds x4 quads    -Instructed RN to administer PRN tylenol. RN reports difficulty with administering medication to patient. Pt spit out all of medication. Ordered Ofirmev IVPB x1 dose.   - STAT CBC with diff, BMP, Lactate, procal, BC x2. Will f/u results.   - Pt stable at this time, NAD. RN instructed to continue to monitor pt and notify provider of any changes in pt status.    2/22 01:00 Notified by RN lab reported critical lab value lactate 2.5. No leukocytosis. Pt temp now 100.2 F Rectal. Unable to obtain remainder of VS at this time as pt is agitated, combative with staff, attempting to hit staff and get OOB. Zyprexa IM administered earlier without effect. Will order ativan 0.5mg IM x1 dose and reassess. Obtain VS when pt calm. Ordered 500mL sodium chloride IVF bolus x1 dose, repeat lactate 04:00AM. Repeat CBC at 04:00AM.

## 2023-02-22 NOTE — SWALLOW BEDSIDE ASSESSMENT ADULT - ORAL PREPARATORY PHASE
Within functional limits unable to form cohesive bolus, +expectoration of bolus/Decreased mastication ability

## 2023-02-22 NOTE — SWALLOW BEDSIDE ASSESSMENT ADULT - SWALLOW EVAL: DIAGNOSIS
no
Pt presents w/ moderate oral dysphagia w/ soft/bite sized trials marked by reduced mastication and inability to form cohesive bolus resulting in independent purposeful expectoration of trial. Oral phase unremarkable for liquid trials, puree and minced/moist trials. Suspect pharyngeal dysphagia w/ thin liquids marked by suspected delayed swallow as per digital palpation & delayed cough following intake. No overt s/s of penetration/aspiration observed w/ solid trials or mildly thick liquids

## 2023-02-22 NOTE — SWALLOW BEDSIDE ASSESSMENT ADULT - SLP GENERAL OBSERVATIONS
Pt recd a&a, reduced cognition, confused/tangential/perseverative speech characteristics, tolerating RA, NAD, 0/10 pain scale pre/post.

## 2023-02-22 NOTE — BH CONSULTATION LIAISON ASSESSMENT NOTE - SUMMARY
Patient is a 89 year old, female; unknown social history ; unknown past psychiatric history; no psychiatric hospitalizations; no known suicide attempts; no known history of violence or arrests; no known active substance abuse or known history of complicated withdrawal; PMH of Parkinson, HTN, AF, overactive bladder with chronic UTI; admitted to [facility] IM 2/2 a unwitnessed fall. Episodes of confusion in ER    Patient seen today for initial eval but eval limited due to patients somnolence. Likely delirium in the setting of parkinsons     dx   Delirium     Recs   -Maintain delirium precautions   -Avoid anticholinergic agents, benzos, opioid as they can further perpetuate confusion   -Frequent re orientation, Hydration, try to avoid restraints and if possible, mobilize patient and PT involvement  -REcommend seroquel 12.5-25mg PO Q6 hours PRN for acute agitation   -Will follow to see if patient requires standing treatment of agitation

## 2023-02-22 NOTE — BH CONSULTATION LIAISON ASSESSMENT NOTE - NSBHCHARTREVIEWVS_PSY_A_CORE FT
Vital Signs Last 24 Hrs  T(C): 36.6 (22 Feb 2023 09:30), Max: 39.1 (21 Feb 2023 20:04)  T(F): 97.8 (22 Feb 2023 09:30), Max: 102.4 (21 Feb 2023 20:04)  HR: 92 (22 Feb 2023 09:30) (72 - 93)  BP: 124/79 (22 Feb 2023 09:30) (124/79 - 158/82)  BP(mean): --  RR: 18 (22 Feb 2023 05:30) (18 - 20)  SpO2: 94% (22 Feb 2023 09:30) (91% - 98%)    Parameters below as of 22 Feb 2023 09:30  Patient On (Oxygen Delivery Method): room air

## 2023-02-22 NOTE — SWALLOW BEDSIDE ASSESSMENT ADULT - SLP PERTINENT HISTORY OF CURRENT PROBLEM
As per H&P: "89 year old female with PMH HTN, AF and Parkinson's (Not on any medications) was brought in after fall. Patient with episodes of confusion and her story keeps on changing, Initially she stated that she slipped, later admitted to being dizzy. Denies any headache, weakness, chest pain, palpitations, cough, dyspnea, nausea, vomiting, abdominal pain, diarrhea, dysuria or frequency."

## 2023-02-22 NOTE — BH CONSULTATION LIAISON ASSESSMENT NOTE - NSBHCHARTREVIEWLAB_PSY_A_CORE FT
Basic Metabolic Panel - STAT (02.21.23 @ 19:50)    Sodium, Serum: 143 mmol/L    Potassium, Serum: 4.9 mmol/L    Chloride, Serum: 107: Chloride reference range changed from ..10/26/2022 mmol/L    Carbon Dioxide, Serum: 21.0 mmol/L    Anion Gap, Serum: 15 mmol/L    Blood Urea Nitrogen, Serum: 30.7 mg/dL    Creatinine, Serum: 0.91 mg/dL    Glucose, Serum: 71 mg/dL    Calcium, Total Serum: 8.7: Prior Reference Range of 8.6 – 10.2 was amended as of 7/26/2022 to 8.4 –  10.5. mg/dL    eGFR: 59: The estimated glomerular filtration rate (eGFR) is calculated using the  2021 CKD-EPI creatinine equation, which does not have a coefficient for  race and is validated in individuals 18 years of age and older (N Engl J  Med 2021; 385:3558-7422). Creatinine-based eGFR may be inaccurate in  various situations including but not limited to extremes of muscle mass,  altered dietary protein intake, or medications that affect renal tubular  creatinine secretion. mL/min/1.73m2

## 2023-02-22 NOTE — BH CONSULTATION LIAISON ASSESSMENT NOTE - HPI (INCLUDE ILLNESS QUALITY, SEVERITY, DURATION, TIMING, CONTEXT, MODIFYING FACTORS, ASSOCIATED SIGNS AND SYMPTOMS)
Patient is a 89 year old, female; unknown social history ; unknown past psychiatric history; no psychiatric hospitalizations; no known suicide attempts; no known history of violence or arrests; no known active substance abuse or known history of complicated withdrawal; PMH of Parkinson, HTN, AF, overactive bladder with chronic UTI; admitted to [facility] IM 2/2 a unwitnessed fall. Episodes of confusion in ER. ER work up showed CT head without any acute changes. CT C-Spine without any fracture or dislocations. CT chest with reticular opacities bilaterally. Normal WBC. Elevated BUN/SCr ration and elevated HCO3. Lactate normal. CT knee with small to moderate effusion. AOx3 on admission. She was given Ceftriaxone and Azithromycin in ER. On admission, patient presents with agitation and acute confusion and psych consulted.     Patient seen and evaluated. Presents with somnolent behavior, arousal only temporarily to high levels of stimulus. Patient in and out of consciousness during interview and unable to reliably participate in exam. Patient oriented to name and location, although could not continue exam 2/2 somnolence.     Precautions  - patient out of bed to chair as much as possible  - lights off at night, preferably window bed with sunlight visible during daytime hours  - constant reorientation  - Seroquel 12.5 mg prn q6 hr for agitation           Patient is a 89 year old, female; unknown social history ; unknown past psychiatric history; no psychiatric hospitalizations; no known suicide attempts; no known history of violence or arrests; no known active substance abuse or known history of complicated withdrawal; PMH of Parkinson, HTN, AF, overactive bladder with chronic UTI; admitted to [facility] IM 2/2 a unwitnessed fall. Episodes of confusion in ER. ER work up showed CT head without any acute changes. CT C-Spine without any fracture or dislocations. CT chest with reticular opacities bilaterally. Normal WBC. Elevated BUN/SCr ration and elevated HCO3. Lactate normal. CT knee with small to moderate effusion. AOx3 on admission. She was given Ceftriaxone and Azithromycin in ER. On admission, patient presents with agitation and acute confusion and psych consulted.     Patient seen and evaluated. Presents with somnolent behavior, arousal only temporarily to high levels of stimulus. Patient in and out of consciousness during interview and unable to reliably participate in exam. Patient oriented to name and location, although could not continue exam 2/2 somnolence.

## 2023-02-22 NOTE — BH CONSULTATION LIAISON ASSESSMENT NOTE - CURRENT MEDICATION
MEDICATIONS  (STANDING):  apixaban 2.5 milliGRAM(s) Oral two times a day  atorvastatin 10 milliGRAM(s) Oral at bedtime  cefTRIAXone Injectable. 1000 milliGRAM(s) IV Push every 24 hours  digoxin     Tablet 250 MICROGram(s) Oral daily  lisinopril 10 milliGRAM(s) Oral daily  polyethylene glycol 3350 17 Gram(s) Oral daily  QUEtiapine 25 milliGRAM(s) Oral at bedtime  senna 2 Tablet(s) Oral at bedtime    MEDICATIONS  (PRN):  acetaminophen     Tablet .. 650 milliGRAM(s) Oral every 6 hours PRN Temp greater or equal to 38C (100.4F), Mild Pain (1 - 3), Moderate Pain (4 - 6)  melatonin 5 milliGRAM(s) Oral at bedtime PRN Insomnia  OLANZapine Injectable 2.5 milliGRAM(s) IntraMuscular every 6 hours PRN combative

## 2023-02-23 NOTE — BH CONSULTATION LIAISON PROGRESS NOTE - NSBHFUPINTERVALHXFT_PSY_A_CORE
Patient is a 89 year old, female; who lives alone, with no known psychiatric history and a PMH of Parkinson, HTN, AF, overactive bladder with chronic UTI; admitted to [facility] IM 2/2 a unwitnessed fall. Episodes of confusion in ER. ER work up showed CT head without any acute changes. CT C-Spine without any fracture or dislocations. CT chest with reticular opacities bilaterally. Normal WBC. Elevated BUN/SCr ration and elevated HCO3. Lactate normal. CT knee with small to moderate effusion. AOx3 on admission. She was given Ceftriaxone and Azithromycin in ER. On admission, patient presents with agitation and acute confusion and psych consulted.     Patient seen for follow up and found to be calm and cooperative. Patient oriented to person and partially to place with significant confusion. Patient asking for her mother repeatedly and asking why she is here but denies any depression, denies s/h ideation or AVH

## 2023-02-23 NOTE — BH CONSULTATION LIAISON PROGRESS NOTE - NSBHASSESSMENTFT_PSY_ALL_CORE
Patient is a 89 year old, female; who lives alone, with no known psychiatric history and a PMH of Parkinson, HTN, AF, overactive bladder with chronic UTI; admitted to [facility] IM 2/2 a unwitnessed fall. Episodes of confusion in ER. ER work up showed CT head without any acute changes. CT C-Spine without any fracture or dislocations. CT chest with reticular opacities bilaterally. Normal WBC. Elevated BUN/SCr ration and elevated HCO3. Lactate normal. CT knee with small to moderate effusion. AOx3 on admission. She was given Ceftriaxone and Azithromycin in ER. On admission, patient presents with agitation and acute confusion and psych consulted.     dx   Delirium     Recs   -Maintain delirium precautions   -Avoid anticholinergic agents, benzos, opioid as they can further perpetuate confusion   -Frequent re orientation, Hydration, try to avoid restraints and if possible, mobilize patient and PT involvement  -Continue Seroquel 25mg PO QHS, if agitition persists, recommend to inc to 37.5mg   -Recommend Seroquel 25mg PO Q6 hours PRN for acute agitation   -Will follow to see if patient requires standing treatment of agitation

## 2023-02-23 NOTE — BH CONSULTATION LIAISON PROGRESS NOTE - NSBHCHARTREVIEWLAB_PSY_A_CORE FT
Basic Metabolic Panel - STAT (02.21.23 @ 19:50)    Sodium, Serum: 143 mmol/L    Potassium, Serum: 4.9 mmol/L    Chloride, Serum: 107: Chloride reference range changed from ..10/26/2022 mmol/L    Carbon Dioxide, Serum: 21.0 mmol/L    Anion Gap, Serum: 15 mmol/L    Blood Urea Nitrogen, Serum: 30.7 mg/dL    Creatinine, Serum: 0.91 mg/dL    Glucose, Serum: 71 mg/dL    Calcium, Total Serum: 8.7: Prior Reference Range of 8.6 – 10.2 was amended as of 7/26/2022 to 8.4 –  10.5. mg/dL    eGFR: 59: The estimated glomerular filtration rate (eGFR) is calculated using the  2021 CKD-EPI creatinine equation, which does not have a coefficient for  race and is validated in individuals 18 years of age and older (N Engl J  Med 2021; 385:7937-4409). Creatinine-based eGFR may be inaccurate in  various situations including but not limited to extremes of muscle mass,  altered dietary protein intake, or medications that affect renal tubular  creatinine secretion. mL/min/1.73m2

## 2023-02-23 NOTE — BH CONSULTATION LIAISON PROGRESS NOTE - NSBHCHARTREVIEWVS_PSY_A_CORE FT
Vital Signs Last 24 Hrs  T(C): 36.6 (23 Feb 2023 13:05), Max: 36.8 (22 Feb 2023 22:49)  T(F): 97.9 (23 Feb 2023 13:05), Max: 98.2 (22 Feb 2023 22:49)  HR: 93 (23 Feb 2023 13:05) (86 - 97)  BP: 140/74 (23 Feb 2023 13:05) (134/85 - 163/90)  BP(mean): --  RR: 18 (23 Feb 2023 04:25) (14 - 18)  SpO2: 94% (23 Feb 2023 13:05) (93% - 96%)    Parameters below as of 23 Feb 2023 13:05  Patient On (Oxygen Delivery Method): room air

## 2023-02-24 NOTE — DIETITIAN INITIAL EVALUATION ADULT - OTHER INFO
89 year old female with PMH HTN, AF and Parkinson's was brought in after fall. Patient with episodes of confusion and her story keeps on changing, Initially she stated that she slipped, later admitted to being dizzy. Discussed with Niece Mckenna who stated patient with fall last summer as well; apparently patient is very stubborn. Lives alone and has help during the day, but no one at night, Recurrent UTIs which is consistent with antibiotic course x 2 in January. Gets confused in Hospital. ER work up showed CT head without any acute changes. CT C-Spine without any fracture or dislocations. CT chest with reticular opacities bilaterally. Normal WBC. Elevated BUN/SCr ration and elevated HCO3. CT knee with small to moderate effusion.

## 2023-02-24 NOTE — DIETITIAN INITIAL EVALUATION ADULT - ETIOLOGY
related to inability to meet sufficient protein-energy needs in setting of cognitive decline, advanced age, recurrent UTIs

## 2023-02-24 NOTE — DIETITIAN NUTRITION RISK NOTIFICATION - ADDITIONAL COMMENTS/DIETITIAN RECOMMENDATIONS
1) Add Ensure TID  2) RD to provide Magic Cup TID  3) Rx MVI and vit C 500mg daily  4) Provide encouragement/assistance as needed during mealtimes to inc PO   5) SLP intervention for possible diet advancement  6) Monitor weights daily for trend/accuracy

## 2023-02-24 NOTE — DIETITIAN INITIAL EVALUATION ADULT - PERTINENT LABORATORY DATA
02-24    145  |  110<H>  |  33.2<H>  ----------------------------<  96  4.1   |  26.0  |  0.70    Ca    9.0      24 Feb 2023 06:22  Phos  2.8     02-24  Mg     2.1     02-24

## 2023-02-24 NOTE — DIETITIAN INITIAL EVALUATION ADULT - ORAL INTAKE PTA/DIET HISTORY
Pt lethar Pt lethargic unable to complete interview. Pt s/p SLp eval (2/22) with recommendation for minced/moist and mild thick fluids. Per 1:1 Pt dislikes the diet consistency completing <25% of meals despite assistance. Pt takes applesauce and mac and cheese well.

## 2023-02-28 NOTE — CHART NOTE - NSCHARTNOTEFT_GEN_A_CORE
Source: Patient [x ]  Family [ ]   other [ x] 1:1    Current Diet: Minced & Moist, Mild thick, Gelatein TID     Patient reports [ ] nausea  [ ] vomiting [ ] diarrhea [ ] constipation  [ x]chewing problems [ x] swallowing issues  [ ] other:     PO intake:  < 50% [x ]   50-75%  [ ]   %  [ ]  other :    Source for PO intake [ ] Patient [ ] family [x ] chart [ x] staff [ ] other    Current Weight:   (2/28)  104 lbs RD bedscale weight- appears inaccurate  (2/18)  136 lbs     % Weight Change: Unclear accuracy of weight 2/2 inconsistency, will continue to monitor     Pertinent Medications: MEDICATIONS  (STANDING):  apixaban 2.5 milliGRAM(s) Oral two times a day  atorvastatin 10 milliGRAM(s) Oral at bedtime  digoxin     Tablet 250 MICROGram(s) Oral daily  lisinopril 10 milliGRAM(s) Oral daily  polyethylene glycol 3350 17 Gram(s) Oral daily  QUEtiapine 25 milliGRAM(s) Oral at bedtime  senna 2 Tablet(s) Oral at bedtime    MEDICATIONS  (PRN):  acetaminophen     Tablet .. 650 milliGRAM(s) Oral every 6 hours PRN Temp greater or equal to 38C (100.4F), Mild Pain (1 - 3), Moderate Pain (4 - 6)  melatonin 5 milliGRAM(s) Oral at bedtime PRN Insomnia  OLANZapine Injectable 2.5 milliGRAM(s) IntraMuscular every 6 hours PRN combative    Pertinent Labs: No recent nutrition-related labs    Skin: No skin breakdown/edema noted     Nutrition focused physical exam conducted - found signs of malnutrition [ ]absent [ x]present    Subcutaneous fat loss: [ ] Orbital fat pads region, [ x]Buccal fat region, [ ]Triceps region,  [ ]Ribs region    Muscle wasting: [x ]Temples region, [ x]Clavicle region, [ ]Shoulder region, [ ]Scapula region, [ ]Interosseous region,  [ ]thigh region, [ ]Calf region    Estimated Needs:   [x ] no change since previous assessment  [ ] recalculated:     Current Nutrition Diagnosis: Pt remains at high nutrition risk secondary to malnutrition (severe acute likely chronic) related to inability to meet sufficient protein-energy needs in setting of cognitive decline, advanced age, recurrent UTIs as evidenced by mod muscle/fat loss, meeting <50% EER x5 days. Pt continues with poor PO intake, per 1:1 accepts a few bites and refuses the rest of the meal. Pt completing ~25% of Magic Cup. Fecal incontinence noted, last documented BM 2/26. D/c planning in progress.    Recommendations:   1) Trial Ensure BID   2) Consider appetite stimulant   3) Rx MVI and vit C 500mg daily   4) Provide encouragement/assistance as needed during mealtimes to inc PO   5) Monitor weights daily for trend/accuracy     Monitoring and Evaluation:   [x ] PO intake [ ] Tolerance to diet prescription [X] Weights  [X] Follow up per protocol [X] Labs:

## 2023-02-28 NOTE — CHART NOTE - NSCHARTNOTEFT_GEN_A_CORE
LATE ENTRY     Called by RN at 0600AM for IV placement in patient. Unsuccessful with multiple attempts of two RNs.   Sign out given to day time PA for IV placement for abx.

## 2023-03-01 NOTE — DISCHARGE NOTE PROVIDER - ATTENDING DISCHARGE PHYSICAL EXAMINATION:
GENERAL: alert, not in distress  CHEST/LUNG: b/l air entry  HEART: reg  ABDOMEN: soft, bs+  EXTREMITIES:  no edema, tenderness

## 2023-03-01 NOTE — DISCHARGE NOTE PROVIDER - HOSPITAL COURSE
89 year old female with PMH HTN, AF and Parkinson's (Not on any medications) was brought in after fall. Niece Mckenna who stated patient with fall last summer as well. Lives alone and has help during the day, but no one at night, Recurrent UTIs which is consistent with antibiotic course x 2 in January. Gets confused in Hospital. ER work up showed CT head without any acute changes. CT C-Spine without any fracture or dislocations. CT chest with reticular opacities bilaterally - nonspecific. CT knee with small to moderate effusion. She was given Ceftriaxone and Azithromycin in ER but as no active bacterial source identified, antibiotics were discontinued. Patient was admitted with recurrent falls, intermittent confusion and dehydration. She received IV hydration and was observed on fall and delirium precautions. Patient seen by PT with recommendations for Rehab and she is now medically stable for discharge.    89 year old female with PMH HTN, AF and Parkinson's (Not on any medications) was brought in after fall. Niece Mckenna who stated patient with fall last summer as well. Lives alone and has help during the day, but no one at night, Recurrent UTIs which is consistent with antibiotic course x 2 in January. Gets confused in Hospital. ER work up showed CT head without any acute changes. CT C-Spine without any fracture or dislocations. CT chest with reticular opacities bilaterally - nonspecific. CT knee with small to moderate effusion.  Patient was admitted with recurrent falls, intermittent confusion and dehydration. She received IV hydration and was observed on fall and delirium precautions. Psych was consulted for delirium vs dementia. Seroquel was initiated at bedtimes. She noted to have a fever on 2/22 with elevated procal and empiric Ceftriaxone was given, now complete. Chest xray was without acute changes. Urinalysis as well as blood and urine cultures were negative.  Patient seen by PT with recommendations for Rehab and she is now medically stable for discharge.    89 year old female with PMH HTN, AF and Parkinson's (Not on any medications) was brought in after fall. Niece Mckenna who stated patient with fall last summer as well. Lives alone and has help during the day, but no one at night, Recurrent UTIs which is consistent with antibiotic course x 2 in January. Gets confused in Hospital. ER work up showed CT head without any acute changes. CT C-Spine without any fracture or dislocations. CT chest with reticular opacities bilaterally - nonspecific. CT knee with small to moderate effusion.  Patient was admitted with recurrent falls, intermittent confusion and dehydration. She received IV hydration and was observed on fall and delirium precautions. Psych was consulted for delirium vs dementia. Seroquel was initiated at bedtimes. She noted to have a fever on 2/22 with elevated procal and empiric Ceftriaxone was given, now complete. Chest xray was without acute changes. Urinalysis as well as blood and urine cultures were negative.  Patient seen by PT with recommendations for Rehab due to decreased functional activity and independence with ALDs with increased fall risk  She is now medically stable for discharge.

## 2023-03-01 NOTE — DISCHARGE NOTE PROVIDER - NSDCCPCAREPLAN_GEN_ALL_CORE_FT
PRINCIPAL DISCHARGE DIAGNOSIS  Diagnosis: Recurrent falls  Assessment and Plan of Treatment: Discharge to [facility] for PT       PRINCIPAL DISCHARGE DIAGNOSIS  Diagnosis: Recurrent falls  Assessment and Plan of Treatment: Discharge to [facility] for PT      SECONDARY DISCHARGE DIAGNOSES  Diagnosis: Dehydration  Assessment and Plan of Treatment: Resolved with IV fluids    Diagnosis: Delirium  Assessment and Plan of Treatment: likely due to hospitalization in setting of Parkinsons. Continue Seroquel for now but hopefull will be able to d/c eventually.    Diagnosis: Chronic atrial fibrillation  Assessment and Plan of Treatment: Continue Eliquis and Digoxin

## 2023-03-01 NOTE — DISCHARGE NOTE PROVIDER - DISCHARGE DIET
Minced and Moist Diet/Nutrition Supplements/Mildly Thick Liquids Regular Diet - No restrictions/Nutrition Supplements

## 2023-03-01 NOTE — DISCHARGE NOTE PROVIDER - NSDCMRMEDTOKEN_GEN_ALL_CORE_FT
apixaban 2.5 mg oral tablet: 1 tab(s) orally 2 times a day  atorvastatin 10 mg oral tablet: 1 tab(s) orally once a day  digoxin 250 mcg (0.25 mg) oral tablet: 1 tab(s) orally once a day  lisinopril 10 mg oral tablet: 1 tab(s) orally once a day  oxybutynin 10 mg/24 hr oral tablet, extended release: 1 tab(s) orally once a day   apixaban 2.5 mg oral tablet: 1 tab(s) orally 2 times a day  atorvastatin 10 mg oral tablet: 1 tab(s) orally once a day  digoxin 250 mcg (0.25 mg) oral tablet: 1 tab(s) orally once a day  lisinopril 10 mg oral tablet: 1 tab(s) orally once a day  oxybutynin 10 mg/24 hr oral tablet, extended release: 1 tab(s) orally once a day  QUEtiapine 25 mg oral tablet: 1 tab(s) orally once a day (at bedtime)

## 2023-03-02 NOTE — PROGRESS NOTE ADULT - PROVIDER SPECIALTY LIST ADULT
Hospitalist
Internal Medicine
Hospitalist
Internal Medicine
Hospitalist
Hospitalist

## 2023-03-02 NOTE — PROGRESS NOTE ADULT - SUBJECTIVE AND OBJECTIVE BOX
INTERVAL HPI/OVERNIGHT EVENTS:    CC: metabolic encephalopathy, a fib, hypertension, falls    Chart and course reviewed.  Patient seen earlier today  denies complaints  aide at bedside    Vital Signs Last 24 Hrs  T(C): 36.4 (25 Feb 2023 08:00), Max: 36.6 (25 Feb 2023 05:26)  T(F): 97.6 (25 Feb 2023 08:00), Max: 97.8 (25 Feb 2023 05:26)  HR: 93 (25 Feb 2023 08:00) (92 - 93)  BP: 148/98 (25 Feb 2023 08:00) (131/85 - 148/98)  BP(mean): --  RR: 18 (25 Feb 2023 08:00) (18 - 18)  SpO2: 93% (25 Feb 2023 08:00) (92% - 93%)    Parameters below as of 25 Feb 2023 08:00  Patient On (Oxygen Delivery Method): room air        PHYSICAL EXAM:    GENERAL: alert, not in distress  CHEST/LUNG: b/l air entry  HEART: reg  ABDOMEN: soft, bs+  EXTREMITIES:  no edema, tenderness    MEDICATIONS  (STANDING):  apixaban 2.5 milliGRAM(s) Oral two times a day  atorvastatin 10 milliGRAM(s) Oral at bedtime  cefTRIAXone Injectable. 1000 milliGRAM(s) IV Push every 24 hours  digoxin     Tablet 250 MICROGram(s) Oral daily  lisinopril 10 milliGRAM(s) Oral daily  polyethylene glycol 3350 17 Gram(s) Oral daily  QUEtiapine 25 milliGRAM(s) Oral at bedtime  senna 2 Tablet(s) Oral at bedtime    MEDICATIONS  (PRN):  acetaminophen     Tablet .. 650 milliGRAM(s) Oral every 6 hours PRN Temp greater or equal to 38C (100.4F), Mild Pain (1 - 3), Moderate Pain (4 - 6)  melatonin 5 milliGRAM(s) Oral at bedtime PRN Insomnia  OLANZapine Injectable 2.5 milliGRAM(s) IntraMuscular every 6 hours PRN combative      Allergies    No Known Allergies    Intolerances          LABS:                          13.7   4.88  )-----------( 216      ( 24 Feb 2023 06:22 )             42.5     02-24    145  |  110<H>  |  33.2<H>  ----------------------------<  96  4.1   |  26.0  |  0.70    Ca    9.0      24 Feb 2023 06:22  Phos  2.8     02-24  Mg     2.1     02-24            RADIOLOGY & ADDITIONAL TESTS:  
INTERVAL HPI/OVERNIGHT EVENTS:    CC: metabolic encephalopathy, a fib, hypertension, falls    No overnight events  states she did not sleep much last night  no other complaints.    Vital Signs Last 24 Hrs  T(C): 36.2 (26 Feb 2023 11:16), Max: 36.4 (26 Feb 2023 05:00)  T(F): 97.2 (26 Feb 2023 11:16), Max: 97.6 (26 Feb 2023 05:00)  HR: 80 (26 Feb 2023 11:16) (80 - 85)  BP: 152/91 (26 Feb 2023 11:16) (152/91 - 156/89)  BP(mean): --  RR: 18 (26 Feb 2023 11:16) (18 - 19)  SpO2: 97% (26 Feb 2023 11:16) (96% - 97%)    Parameters below as of 26 Feb 2023 11:16  Patient On (Oxygen Delivery Method): room air        PHYSICAL EXAM:    GENERAL: alert, not in distress, confused, oriented x 2  CHEST/LUNG: b/l air entry  HEART: reg  ABDOMEN: soft, bs+  EXTREMITIES:  no edema, tenderness    MEDICATIONS  (STANDING):  apixaban 2.5 milliGRAM(s) Oral two times a day  atorvastatin 10 milliGRAM(s) Oral at bedtime  digoxin     Tablet 250 MICROGram(s) Oral daily  lisinopril 10 milliGRAM(s) Oral daily  polyethylene glycol 3350 17 Gram(s) Oral daily  QUEtiapine 25 milliGRAM(s) Oral at bedtime  senna 2 Tablet(s) Oral at bedtime    MEDICATIONS  (PRN):  acetaminophen     Tablet .. 650 milliGRAM(s) Oral every 6 hours PRN Temp greater or equal to 38C (100.4F), Mild Pain (1 - 3), Moderate Pain (4 - 6)  melatonin 5 milliGRAM(s) Oral at bedtime PRN Insomnia  OLANZapine Injectable 2.5 milliGRAM(s) IntraMuscular every 6 hours PRN combative      Allergies    No Known Allergies    Intolerances          LABS:                  RADIOLOGY & ADDITIONAL TESTS:  
  no acute complaints/events per 1:1  ros limited 2/2 confusion      MEDICATIONS  (STANDING):  apixaban 2.5 milliGRAM(s) Oral two times a day  atorvastatin 10 milliGRAM(s) Oral at bedtime  digoxin     Tablet 250 MICROGram(s) Oral daily  lisinopril 10 milliGRAM(s) Oral daily  oxybutynin 10 milliGRAM(s) Oral daily  senna 2 Tablet(s) Oral at bedtime    MEDICATIONS  (PRN):  melatonin 5 milliGRAM(s) Oral at bedtime PRN Insomnia  QUEtiapine 25 milliGRAM(s) Oral every 8 hours PRN agitation      Allergies    No Known Allergies      Vital Signs Last 24 Hrs  T(C): 36.6 (2023 10:36), Max: 37.2 (2023 00:35)  T(F): 97.9 (2023 10:36), Max: 98.9 (2023 00:35)  HR: 82 (2023 10:36) (82 - 99)  BP: 165/97 (2023 10:36) (115/64 - 165/97)  BP(mean): --  RR: 18 (2023 10:36) (18 - 20)  SpO2: 94% (2023 10:36) (94% - 100%)    Parameters below as of 2023 10:36  Patient On (Oxygen Delivery Method): room air        PHYSICAL EXAM:    GENERAL: NAD  CHEST/LUNG: Clear to ausculation bilaterally  HEART: irreg irreg rate and rhythm; S1 S2  ABDOMEN: Soft, ; Bowel sounds present  EXTREMITIES:  no edema   NERVOUS SYSTEM:  gen weakness nonfocal.  confused    LABS:                        12.6   4.65  )-----------( 183      ( 2023 11:40 )             38.6     02-    141  |  107  |  22.5<H>  ----------------------------<  85  4.0   |  23.0  |  0.78    Ca    8.6      2023 06:10    TPro  6.3<L>  /  Alb  3.4  /  TBili  0.5  /  DBili  x   /  AST  29  /  ALT  24  /  AlkPhos  137<H>  02-18      Urinalysis Basic - ( 2023 05:57 )    Color: Yellow / Appearance: Slightly Turbid / S.015 / pH: x  Gluc: x / Ketone: Negative  / Bili: Negative / Urobili: Negative mg/dL   Blood: x / Protein: 15 / Nitrite: Negative   Leuk Esterase: Trace / RBC: Negative /HPF / WBC 0-2 /HPF   Sq Epi: x / Non Sq Epi: Occasional / Bacteria: Occasional        CAPILLARY BLOOD GLUCOSE            RADIOLOGY & ADDITIONAL TESTS:  
  seen for delirium    confused but follows directions  ros negative     MEDICATIONS  (STANDING):  apixaban 2.5 milliGRAM(s) Oral two times a day  atorvastatin 10 milliGRAM(s) Oral at bedtime  cefTRIAXone Injectable. 1000 milliGRAM(s) IV Push every 24 hours  digoxin     Tablet 250 MICROGram(s) Oral daily  lisinopril 10 milliGRAM(s) Oral daily  polyethylene glycol 3350 17 Gram(s) Oral daily  QUEtiapine 25 milliGRAM(s) Oral at bedtime  senna 2 Tablet(s) Oral at bedtime    MEDICATIONS  (PRN):  acetaminophen     Tablet .. 650 milliGRAM(s) Oral every 6 hours PRN Temp greater or equal to 38C (100.4F), Mild Pain (1 - 3), Moderate Pain (4 - 6)  melatonin 5 milliGRAM(s) Oral at bedtime PRN Insomnia  OLANZapine Injectable 2.5 milliGRAM(s) IntraMuscular every 6 hours PRN combative      Allergies    No Known Allergies      Vital Signs Last 24 Hrs  T(C): 36.8 (24 Feb 2023 04:42), Max: 36.8 (24 Feb 2023 04:42)  T(F): 98.3 (24 Feb 2023 04:42), Max: 98.3 (24 Feb 2023 04:42)  HR: 63 (24 Feb 2023 05:44) (50 - 93)  BP: 164/83 (24 Feb 2023 04:42) (140/74 - 166/98)  BP(mean): --  RR: 18 (24 Feb 2023 04:42) (18 - 18)  SpO2: 92% (24 Feb 2023 04:42) (92% - 95%)    Parameters below as of 23 Feb 2023 21:48  Patient On (Oxygen Delivery Method): room air        PHYSICAL EXAM:    GENERAL: NAD  CHEST/LUNG: Clear to ausculation bilaterall  HEART: Regular rate and rhythm; S1 S2  ABDOMEN: Soft, Bowel sounds present  EXTREMITIES:  no edema   NERVOUS SYSTEM:  Alert & Oriented X2 nonfocal neuro following commands     LABS:                        13.7   4.88  )-----------( 216      ( 24 Feb 2023 06:22 )             42.5     02-24    145  |  110<H>  |  33.2<H>  ----------------------------<  96  4.1   |  26.0  |  0.70    Ca    9.0      24 Feb 2023 06:22  Phos  2.8     02-24  Mg     2.1     02-24            CAPILLARY BLOOD GLUCOSE            RADIOLOGY & ADDITIONAL TESTS:  
  seen for delirium/ fever    no complaints/events  very confused  stating she needs to go and help her mom    MEDICATIONS  (STANDING):  apixaban 2.5 milliGRAM(s) Oral two times a day  atorvastatin 10 milliGRAM(s) Oral at bedtime  cefTRIAXone Injectable. 1000 milliGRAM(s) IV Push every 24 hours  digoxin     Tablet 250 MICROGram(s) Oral daily  lisinopril 10 milliGRAM(s) Oral daily  polyethylene glycol 3350 17 Gram(s) Oral daily  QUEtiapine 25 milliGRAM(s) Oral at bedtime  senna 2 Tablet(s) Oral at bedtime    MEDICATIONS  (PRN):  acetaminophen     Tablet .. 650 milliGRAM(s) Oral every 6 hours PRN Temp greater or equal to 38C (100.4F), Mild Pain (1 - 3), Moderate Pain (4 - 6)  melatonin 5 milliGRAM(s) Oral at bedtime PRN Insomnia  OLANZapine Injectable 2.5 milliGRAM(s) IntraMuscular every 6 hours PRN combative      Allergies    No Known Allergies      Vital Signs Last 24 Hrs  T(C): 36.6 (2023 08:05), Max: 36.8 (2023 22:49)  T(F): 97.9 (2023 08:05), Max: 98.2 (2023 22:49)  HR: 96 (2023 08:05) (86 - 97)  BP: 155/97 (2023 08:05) (134/85 - 163/90)  BP(mean): --  RR: 18 (2023 04:25) (14 - 18)  SpO2: 94% (2023 08:05) (93% - 96%)    Parameters below as of 2023 08:05  Patient On (Oxygen Delivery Method): room air        PHYSICAL EXAM:    GENERAL: NAD  CHEST/LUNG: Clear to ausculation bilaterally  HEART: Regular rate and rhythm; S1 S2  ABDOMEN: Soft, Bowel sounds present  EXTREMITIES: no edema   NERVOUS SYSTEM:  Alert & Oriented X1 (self) nonfocal  confused     LABS:                        13.7   8.44  )-----------( 166      ( 2023 19:50 )             41.5     02-    143  |  107  |  30.7<H>  ----------------------------<  71  4.9   |  21.0<L>  |  0.91    Ca    8.7      2023 19:50        Urinalysis Basic - ( 2023 09:30 )    Color: Yellow / Appearance: Clear / S.020 / pH: x  Gluc: x / Ketone: Small  / Bili: Negative / Urobili: Negative mg/dL   Blood: x / Protein: 15 / Nitrite: Negative   Leuk Esterase: Negative / RBC: Negative /HPF / WBC Negative /HPF   Sq Epi: x / Non Sq Epi: Occasional / Bacteria: Occasional        CAPILLARY BLOOD GLUCOSE            RADIOLOGY & ADDITIONAL TESTS:  
  seen for dementia    agitated overnight ,got seroquel  sleeping heavily this am      MEDICATIONS  (STANDING):  apixaban 2.5 milliGRAM(s) Oral two times a day  atorvastatin 10 milliGRAM(s) Oral at bedtime  digoxin     Tablet 250 MICROGram(s) Oral daily  lisinopril 10 milliGRAM(s) Oral daily  oxybutynin 10 milliGRAM(s) Oral daily  QUEtiapine 12.5 milliGRAM(s) Oral at bedtime  senna 2 Tablet(s) Oral at bedtime    MEDICATIONS  (PRN):  acetaminophen     Tablet .. 650 milliGRAM(s) Oral every 6 hours PRN Temp greater or equal to 38C (100.4F), Mild Pain (1 - 3), Moderate Pain (4 - 6)  melatonin 5 milliGRAM(s) Oral at bedtime PRN Insomnia  QUEtiapine 25 milliGRAM(s) Oral every 8 hours PRN agitation      Allergies    No Known Allergies      Vital Signs Last 24 Hrs  T(C): 36.5 (21 Feb 2023 13:30), Max: 36.8 (21 Feb 2023 03:49)  T(F): 97.7 (21 Feb 2023 13:30), Max: 98.3 (21 Feb 2023 03:49)  HR: 75 (21 Feb 2023 13:30) (75 - 100)  BP: 176/87 (21 Feb 2023 13:30) (148/82 - 177/92)  BP(mean): --  RR: 18 (21 Feb 2023 03:49) (18 - 18)  SpO2: 95% (21 Feb 2023 13:30) (94% - 95%)    Parameters below as of 21 Feb 2023 13:30  Patient On (Oxygen Delivery Method): room air        PHYSICAL EXAM:    GENERAL: NAD  CHEST/LUNG: Clear to ausculation bilaterally  HEART: Regular rate and rhythm; S1 S2  ABDOMEN: Soft,  Bowel sounds present  EXTREMITIES:  no edema     LABS:    02-20    141  |  107  |  22.5<H>  ----------------------------<  85  4.0   |  23.0  |  0.78    Ca    8.6      20 Feb 2023 06:10            CAPILLARY BLOOD GLUCOSE            RADIOLOGY & ADDITIONAL TESTS:  
INTERVAL HPI/OVERNIGHT EVENTS:    CC:  metabolic encephalopathy, a fib, hypertension, falls, dementia    No overnight events  denies complaints.    Vital Signs Last 24 Hrs  T(C): 36.8 (02 Mar 2023 10:54), Max: 36.8 (01 Mar 2023 23:00)  T(F): 98.2 (02 Mar 2023 10:54), Max: 98.2 (01 Mar 2023 23:00)  HR: 83 (02 Mar 2023 10:54) (74 - 92)  BP: 160/96 (02 Mar 2023 10:54) (135/92 - 160/96)  BP(mean): --  RR: 18 (02 Mar 2023 10:54) (18 - 18)  SpO2: 96% (02 Mar 2023 04:45) (94% - 96%)    Parameters below as of 02 Mar 2023 04:45  Patient On (Oxygen Delivery Method): room air        PHYSICAL EXAM:    GENERAL: alert, not in distress  CHEST/LUNG: b/l air entry  HEART: reg  ABDOMEN: soft, bs+  EXTREMITIES:  no edema, tenderness    MEDICATIONS  (STANDING):  apixaban 2.5 milliGRAM(s) Oral two times a day  atorvastatin 10 milliGRAM(s) Oral at bedtime  digoxin     Tablet 250 MICROGram(s) Oral daily  lisinopril 10 milliGRAM(s) Oral daily  polyethylene glycol 3350 17 Gram(s) Oral daily  QUEtiapine 25 milliGRAM(s) Oral at bedtime  senna 2 Tablet(s) Oral at bedtime  sodium chloride 0.9%. 1000 milliLiter(s) (70 mL/Hr) IV Continuous <Continuous>    MEDICATIONS  (PRN):  acetaminophen     Tablet .. 650 milliGRAM(s) Oral every 6 hours PRN Temp greater or equal to 38C (100.4F), Mild Pain (1 - 3), Moderate Pain (4 - 6)  melatonin 5 milliGRAM(s) Oral at bedtime PRN Insomnia  OLANZapine Injectable 2.5 milliGRAM(s) IntraMuscular every 6 hours PRN combative      Allergies    No Known Allergies    Intolerances          LABS:                          16.1   5.17  )-----------( 139      ( 01 Mar 2023 16:20 )             53.0     03-01    144  |  108  |  31.8<H>  ----------------------------<  120<H>  4.8   |  23.0  |  0.67    Ca    9.5      01 Mar 2023 16:20            RADIOLOGY & ADDITIONAL TESTS:  
INTERVAL HPI/OVERNIGHT EVENTS:    CC: metabolic encephalopathy, a fib, hypertension, falls    No overnight events  denies complaints.    Vital Signs Last 24 Hrs  T(C): 36.9 (27 Feb 2023 10:29), Max: 36.9 (27 Feb 2023 10:29)  T(F): 98.4 (27 Feb 2023 10:29), Max: 98.4 (27 Feb 2023 10:29)  HR: 74 (27 Feb 2023 10:29) (74 - 84)  BP: 120/68 (27 Feb 2023 10:29) (120/68 - 129/92)  BP(mean): --  RR: 19 (27 Feb 2023 10:29) (18 - 19)  SpO2: 94% (27 Feb 2023 04:59) (94% - 94%)    Parameters below as of 27 Feb 2023 04:59  Patient On (Oxygen Delivery Method): room air        PHYSICAL EXAM:    GENERAL: alert, oriented x 1-2, not in distress  CHEST/LUNG: b/l air entry  HEART: reg  ABDOMEN: soft, bs+  EXTREMITIES:  no edema, tenderness    MEDICATIONS  (STANDING):  apixaban 2.5 milliGRAM(s) Oral two times a day  atorvastatin 10 milliGRAM(s) Oral at bedtime  digoxin     Tablet 250 MICROGram(s) Oral daily  lisinopril 10 milliGRAM(s) Oral daily  polyethylene glycol 3350 17 Gram(s) Oral daily  QUEtiapine 25 milliGRAM(s) Oral at bedtime  senna 2 Tablet(s) Oral at bedtime    MEDICATIONS  (PRN):  acetaminophen     Tablet .. 650 milliGRAM(s) Oral every 6 hours PRN Temp greater or equal to 38C (100.4F), Mild Pain (1 - 3), Moderate Pain (4 - 6)  melatonin 5 milliGRAM(s) Oral at bedtime PRN Insomnia  OLANZapine Injectable 2.5 milliGRAM(s) IntraMuscular every 6 hours PRN combative      Allergies    No Known Allergies    Intolerances          LABS:                  RADIOLOGY & ADDITIONAL TESTS:  
  seen for dementia, delirium    agitated overnight/ febrile  sedated this am  destinee mallory to obtain     MEDICATIONS  (STANDING):  apixaban 2.5 milliGRAM(s) Oral two times a day  atorvastatin 10 milliGRAM(s) Oral at bedtime  digoxin     Tablet 250 MICROGram(s) Oral daily  lisinopril 10 milliGRAM(s) Oral daily  QUEtiapine 12.5 milliGRAM(s) Oral at bedtime  senna 2 Tablet(s) Oral at bedtime    MEDICATIONS  (PRN):  acetaminophen     Tablet .. 650 milliGRAM(s) Oral every 6 hours PRN Temp greater or equal to 38C (100.4F), Mild Pain (1 - 3), Moderate Pain (4 - 6)  melatonin 5 milliGRAM(s) Oral at bedtime PRN Insomnia  QUEtiapine 25 milliGRAM(s) Oral every 8 hours PRN agitation      Allergies    No Known Allergies    Vital Signs Last 24 Hrs  T(C): 36.6 (2023 09:30), Max: 39.1 (2023 20:04)  T(F): 97.8 (2023 09:30), Max: 102.4 (2023 20:04)  HR: 92 (2023 09:30) (72 - 93)  BP: 124/79 (2023 09:30) (124/79 - 176/87)  BP(mean): --  RR: 18 (2023 05:30) (18 - 20)  SpO2: 94% (2023 09:30) (91% - 98%)    Parameters below as of 2023 09:30  Patient On (Oxygen Delivery Method): room air        PHYSICAL EXAM:    GENERAL: NAD  CHEST/LUNG: Clear to ausculation bilaterally  HEART: Regular rate and rhythm; S1 S2  ABDOMEN: Soft, Bowel sounds present  EXTREMITIES:  no edema   NERVOUS SYSTEM:  sedated     LABS:                        13.7   8.44  )-----------( 166      ( 2023 19:50 )             41.5         143  |  107  |  30.7<H>  ----------------------------<  71  4.9   |  21.0<L>  |  0.91    Ca    8.7      2023 19:50        Urinalysis Basic - ( 2023 09:30 )    Color: Yellow / Appearance: Clear / S.020 / pH: x  Gluc: x / Ketone: Small  / Bili: Negative / Urobili: Negative mg/dL   Blood: x / Protein: 15 / Nitrite: Negative   Leuk Esterase: Negative / RBC: Negative /HPF / WBC Negative /HPF   Sq Epi: x / Non Sq Epi: Occasional / Bacteria: Occasional        CAPILLARY BLOOD GLUCOSE            RADIOLOGY & ADDITIONAL TESTS:  
INTERVAL HPI/OVERNIGHT EVENTS:    CC:  metabolic encephalopathy, a fib, hypertension, falls    No overnight events  confused, pleasant  no complaints.    Vital Signs Last 24 Hrs  T(C): 36.4 (28 Feb 2023 10:42), Max: 36.4 (27 Feb 2023 17:03)  T(F): 97.5 (28 Feb 2023 10:42), Max: 97.5 (27 Feb 2023 17:03)  HR: 60 (28 Feb 2023 10:42) (60 - 96)  BP: 146/93 (28 Feb 2023 10:42) (115/78 - 153/73)  BP(mean): --  RR: 19 (28 Feb 2023 10:42) (18 - 19)  SpO2: 93% (28 Feb 2023 04:56) (93% - 95%)    Parameters below as of 28 Feb 2023 04:56  Patient On (Oxygen Delivery Method): room air        PHYSICAL EXAM:    GENERAL: alert, not in distress, confused  CHEST/LUNG: b/l air entry  HEART: reg  ABDOMEN: soft, bs+  EXTREMITIES:  no edema, tenderness    MEDICATIONS  (STANDING):  apixaban 2.5 milliGRAM(s) Oral two times a day  atorvastatin 10 milliGRAM(s) Oral at bedtime  digoxin     Tablet 250 MICROGram(s) Oral daily  lisinopril 10 milliGRAM(s) Oral daily  polyethylene glycol 3350 17 Gram(s) Oral daily  QUEtiapine 25 milliGRAM(s) Oral at bedtime  senna 2 Tablet(s) Oral at bedtime    MEDICATIONS  (PRN):  acetaminophen     Tablet .. 650 milliGRAM(s) Oral every 6 hours PRN Temp greater or equal to 38C (100.4F), Mild Pain (1 - 3), Moderate Pain (4 - 6)  melatonin 5 milliGRAM(s) Oral at bedtime PRN Insomnia  OLANZapine Injectable 2.5 milliGRAM(s) IntraMuscular every 6 hours PRN combative      Allergies    No Known Allergies    Intolerances          LABS:                  RADIOLOGY & ADDITIONAL TESTS:  
INTERVAL HPI/OVERNIGHT EVENTS:    CC: metabolic encephalopathy, a fib, hypertension, falls, dementia    no overnight events  denies complaints    Vital Signs Last 24 Hrs  T(C): 36.4 (01 Mar 2023 15:30), Max: 36.4 (28 Feb 2023 16:37)  T(F): 97.6 (01 Mar 2023 15:30), Max: 97.6 (28 Feb 2023 16:37)  HR: 92 (01 Mar 2023 15:30) (69 - 92)  BP: 137/70 (01 Mar 2023 15:30) (128/91 - 152/91)  BP(mean): --  RR: 18 (01 Mar 2023 15:30) (18 - 18)  SpO2: 94% (01 Mar 2023 15:30) (94% - 96%)    Parameters below as of 01 Mar 2023 15:30  Patient On (Oxygen Delivery Method): room air        PHYSICAL EXAM:    GENERAL: alert, confused, not in distress  CHEST/LUNG: b/l air entry  HEART: reg  ABDOMEN: soft, bs+  EXTREMITIES:  no edema, tenderness    MEDICATIONS  (STANDING):  apixaban 2.5 milliGRAM(s) Oral two times a day  atorvastatin 10 milliGRAM(s) Oral at bedtime  digoxin     Tablet 250 MICROGram(s) Oral daily  lisinopril 10 milliGRAM(s) Oral daily  polyethylene glycol 3350 17 Gram(s) Oral daily  QUEtiapine 25 milliGRAM(s) Oral at bedtime  senna 2 Tablet(s) Oral at bedtime    MEDICATIONS  (PRN):  acetaminophen     Tablet .. 650 milliGRAM(s) Oral every 6 hours PRN Temp greater or equal to 38C (100.4F), Mild Pain (1 - 3), Moderate Pain (4 - 6)  melatonin 5 milliGRAM(s) Oral at bedtime PRN Insomnia  OLANZapine Injectable 2.5 milliGRAM(s) IntraMuscular every 6 hours PRN combative      Allergies    No Known Allergies    Intolerances          LABS:                  RADIOLOGY & ADDITIONAL TESTS:

## 2023-03-02 NOTE — DISCHARGE NOTE NURSING/CASE MANAGEMENT/SOCIAL WORK - PATIENT PORTAL LINK FT
You can access the FollowMyHealth Patient Portal offered by [facility] by registering at the following website: [email or web address]. By joining [facility]’s FollowMyHealth portal, you will also be able to view your health information using other applications (apps) compatible with our system.

## 2023-03-02 NOTE — DISCHARGE NOTE NURSING/CASE MANAGEMENT/SOCIAL WORK - NSDPDISTO_GEN_ALL_CORE
Current Discharge Medication List  
  
START taking these medications Dose & Instructions Dispensing Information Comments Morning Noon Evening Bedtime  
 albuterol-ipratropium 2.5 mg-0.5 mg/3 ml Nebu Commonly known as:  Erum Beltre Your last dose was: Your next dose is:    
   
   
 Dose:  3 mL  
3 mL by Nebulization route four (4) times daily. Quantity:  30 Nebule Refills:  0  
     
   
   
   
  
 aluminum-magnesium hydroxide 200-200 mg/5 mL suspension Commonly known as:  MAALOX Your last dose was: Your next dose is:    
   
   
 Dose:  15 mL Take 15 mL by mouth four (4) times daily as needed for Indigestion. Quantity:  100 mL Refills:  0  
     
   
   
   
  
 cefTRIAXone 2 gram 2 g IVPB Your last dose was: Your next dose is:    
   
   
 Dose:  2 g  
2 g by IntraVENous route every twenty-four (24) hours for 20 days. Quantity:  20 Dose Refills:  0  
     
   
   
   
  
 famotidine 20 mg tablet Commonly known as:  PEPCID Your last dose was: Your next dose is:    
   
   
 Dose:  20 mg Take 1 Tab by mouth nightly. Quantity:  30 Tab Refills:  0  
     
   
   
   
  
 fluconazole 200 mg tablet Commonly known as:  DIFLUCAN Your last dose was: Your next dose is:    
   
   
 Dose:  200 mg Take 1 Tab by mouth daily for 6 days. FDA advises cautious prescribing of oral fluconazole in pregnancy. Quantity:  6 Tab Refills:  0  
     
   
   
   
  
 folic acid 1 mg tablet Commonly known as:  Google Your last dose was: Your next dose is:    
   
   
 Dose:  1 mg Take 1 Tab by mouth daily. Quantity:  30 Tab Refills:  0 Lactobacillus Acidoph & Bulgar 1 million cell Tab tablet Commonly known as:  Concha Allie Your last dose was: Your next dose is:    
   
   
 Dose:  1 Tab Take 1 Tab by mouth two (2) times a day for 21 days. Quantity:  42 Tab Refills:  0  
     
   
   
   
  
 midodrine 2.5 mg tablet Commonly known as:  Marta Williamson Your last dose was: Your next dose is:    
   
   
 5 mg [2 tablets] po three time a day for 5 days then 2.5 mg po three time a day x 5 days then stop Quantity:  45 Tab Refills:  0  
     
   
   
   
  
 polyethylene glycol 17 gram packet Commonly known as:  Carroll Duncan Your last dose was: Your next dose is:    
   
   
 Dose:  17 g Take 1 Packet by mouth two (2) times a day. Quantity:  30 Packet Refills:  0 CONTINUE these medications which have CHANGED Dose & Instructions Dispensing Information Comments Morning Noon Evening Bedtime  
 allopurinol 100 mg tablet Commonly known as:  Turner Lopez What changed:  how much to take Your last dose was: Your next dose is:    
   
   
 Dose:  50 mg Take 0.5 Tabs by mouth daily. Indications: HYPERURICEMIA Quantity:  30 Tab Refills:  0  
     
   
   
   
  
 insulin glargine 100 unit/mL injection Commonly known as:  LANTUS What changed:  how much to take Your last dose was: Your next dose is:    
   
   
 Dose:  5 Units 5 Units by SubCUTAneous route nightly. Indications: type 2 diabetes mellitus Quantity:  1 Vial  
Refills:  0  
     
   
   
   
  
 oxyCODONE-acetaminophen 7.5-325 mg per tablet Commonly known as:  PERCOCET 7.5 What changed:   
- when to take this 
- reasons to take this Your last dose was: Your next dose is:    
   
   
 Dose:  1 Tab Take 1 Tab by mouth every six (6) hours as needed (for pain level greater than 5/10). Max Daily Amount: 4 Tabs. Indications: Pain Quantity:  20 Tab Refills:  0 CONTINUE these medications which have NOT CHANGED Dose & Instructions Dispensing Information Comments Morning Noon Evening Bedtime  
 acetaminophen 325 mg tablet Commonly known as:  TYLENOL Your last dose was: Your next dose is:    
   
   
 Dose:  650 mg Take 2 Tabs by mouth every four (4) hours as needed (for fever or pain level less than 5/10). Indications: Fever, Pain Quantity:  30 Tab Refills:  0  
     
   
   
   
  
 cholecalciferol 1,000 unit tablet Commonly known as:  VITAMIN D3 Your last dose was: Your next dose is:    
   
   
 Dose:  2000 Units Take 2 Tabs by mouth daily. Indications: PREVENTION OF VITAMIN D DEFICIENCY Quantity:  30 Tab Refills:  0  
     
   
   
   
  
 ferrous sulfate 325 mg (65 mg iron) tablet Your last dose was: Your next dose is:    
   
   
 Dose:  325 mg Take 1 Tab by mouth two (2) times daily (with meals) for 14 days. Quantity:  28 Tab Refills:  0  
     
   
   
   
  
 insulin aspart 100 unit/mL injection Commonly known as:  Marlene Mae Your last dose was: Your next dose is: INITIATE INSULIN CORRECTIVE PROTOCOL (HR): Normal Insulin Sensitivity  For Blood Sugar (mg/dL) of:    Less than 150 =   0 units          150 -199 =   2 units 200 -249 =   4 units 250 -299 =   6 units 300 -349 =   8 units 350 and above =   10 units If 2 glucose readings are above 200 mg/dL Quantity:  10 mL Refills:  6  
     
   
   
   
  
 pravastatin 40 mg tablet Commonly known as:  PRAVACHOL Your last dose was: Your next dose is:    
   
   
 Dose:  40 mg Take 40 mg by mouth nightly. Indications: DYSLIPIDEMIA Refills:  0  
     
   
   
   
  
 senna-docusate 8.6-50 mg per tablet Commonly known as:  Silvia Milvia Your last dose was: Your next dose is:    
   
   
 Dose:  2 Tab Take 2 Tabs by mouth daily (after dinner). Indications: Constipation Quantity:  30 Tab Refills:  0 ZOFRAN ODT 4 mg disintegrating tablet Generic drug:  ondansetron Your last dose was: Your next dose is: Dose:  4 mg Take 4 mg by mouth every eight (8) hours as needed for Nausea. Refills:  0 STOP taking these medications   
 bumetanide 1 mg tablet Commonly known as:  BUMEX  
   
  
 carvedilol 6.25 mg tablet Commonly known as:  COREG  
   
  
 cyclobenzaprine 10 mg tablet Commonly known as:  FLEXERIL  
   
  
 docusate sodium 100 mg capsule Commonly known as:  COLACE  
   
  
 gabapentin 400 mg capsule Commonly known as:  NEURONTIN  
   
  
 KLOR-CON M20 20 mEq tablet Generic drug:  potassium chloride SITagliptin 50 mg tablet Commonly known as:  Mardella Early Where to Get Your Medications Information on where to get these meds will be given to you by the nurse or doctor. ! Ask your nurse or doctor about these medications  
  albuterol-ipratropium 2.5 mg-0.5 mg/3 ml Nebu  
 allopurinol 100 mg tablet  
 aluminum-magnesium hydroxide 200-200 mg/5 mL suspension  
 cefTRIAXone 2 gram 2 g IVPB  
 famotidine 20 mg tablet  
 ferrous sulfate 325 mg (65 mg iron) tablet  
 fluconazole 480 mg tablet  
 folic acid 1 mg tablet  
 insulin glargine 100 unit/mL injection Lactobacillus Acidoph & Bulgar 1 million cell Tab tablet  
 midodrine 2.5 mg tablet  
 oxyCODONE-acetaminophen 7.5-325 mg per tablet  
 polyethylene glycol 17 gram packet Sub-Acute rehab Acute rehab

## 2023-03-02 NOTE — PROGRESS NOTE ADULT - REASON FOR ADMISSION
Fall, needs to go to Rehab

## 2023-03-02 NOTE — DISCHARGE NOTE NURSING/CASE MANAGEMENT/SOCIAL WORK - NSDCPEFALRISK_GEN_ALL_CORE
For information on Fall & Injury Prevention, visit: [email or web address] OR  [email or web address] OR  https://www.cdc.gov/steadi/patient.html

## 2023-03-02 NOTE — PROGRESS NOTE ADULT - ASSESSMENT
89 year old female with PMH HTN, AF and Parkinson's (Not on any medications) was brought in after fall.  Episodes of confusion in ER. ER work up showed CT head without any acute changes. CT C-Spine without any fracture or dislocations. CT chest with reticular opacities bilaterally. Normal WBC. Elevated BUN/SCr ration and elevated HCO3. Lactate normal. CT knee with small to moderate effusion. She was given Ceftriaxone and Azithromycin in ER.  Patient seen by PT with recommendations for Rehab       Fall, recurrent  Intermittent confusion  Dehydration    monitor off 1:1  - B12, TSH, Ammonia wnl   - Delirium precautions; Quetiapine PRN  - PT recommendation for Rehab  bedtime low dose seroquel    AF, rate controlled on EKG  - Digoxin, level 0.8  - Apixaban;    Overactive bladder  - Oxybutynin    Reticular opacities on CT chest - non specific  Patient without any symptoms, leukocytosis or fever.  Defer further antibiotics    VTE Prophylaxis  - Apixaban  Ambulate patient  with assistance        [facility] dispo pending  
89 year old female with PMH HTN, AF and Parkinson's (Not on any medications) was brought in after fall.  Episodes of confusion in ER. ER work up showed CT head without any acute changes. CT C-Spine without any fracture or dislocations. CT chest with reticular opacities bilaterally. Normal WBC. Elevated BUN/SCr ration and elevated HCO3. Lactate normal. CT knee with small to moderate effusion. She was given Ceftriaxone and Azithromycin in ER.  Patient seen by PT with recommendations for Rehab     fever:  cultures negative     rocephin empirically as procalc elevated    speech eval, cxr     ua negative     bowel regimen.       Fall, recurrent  Intermittent confusion/ delirium   Dehydration    monitor off 1:1  - B12, TSH, Ammonia wnl   - Delirium precautions; Quetiapine qhs and prn olanzapine  - PT recommendation for Rehab    AF, rate controlled on EKG  - Digoxin, level 0.8  - Apixaban;    Overactive bladder  - hold oxybutynin as can cause delirium     Reticular opacities on CT chest - non specific  Patient without any symptoms, leukocytosis or fever.  Defer further antibiotics    VTE Prophylaxis  - Apixaban  Ambulate patient  with assistance  left message for family    dc in 24-48 hrs if continues to remain stable         [facility] dispo pending  updated family over the phone 
89 year old female with PMH HTN, AF and Parkinson's (Not on any medications) was brought in after fall.  Episodes of confusion in ER. ER work up showed CT head without any acute changes. CT C-Spine without any fracture or dislocations. CT chest with reticular opacities bilaterally. Normal WBC. Elevated BUN/SCr ration and elevated HCO3. Lactate normal. CT knee with small to moderate effusion. She was given Ceftriaxone and Azithromycin in ER.  Patient seen by PT with recommendations for Rehab     fever: f/u cultures    rocephin empirically as procalc elevated    speech eval, cxr     ua not impressive    bowel regimen.       Fall, recurrent  Intermittent confusion/ delirium   Dehydration    monitor off 1:1  - B12, TSH, Ammonia wnl   - Delirium precautions; Quetiapine qhs and prn olanzapine  - PT recommendation for Rehab    AF, rate controlled on EKG  - Digoxin, level 0.8  - Apixaban;    Overactive bladder  - hold oxybutynin as can cause delirium     Reticular opacities on CT chest - non specific  Patient without any symptoms, leukocytosis or fever.  Defer further antibiotics    VTE Prophylaxis  - Apixaban  Ambulate patient  with assistance        DEVEN esquivel pending  updated family over the phone 
89 yr old female with hypertension, Parkinson's disease, atrial fibrillation presented to the ED with complaints of falls and confusion. In ED, noted to have elevated BUN, clinical signs of dehydration. CT head and spine were negative. PT evaluation was done, recommended [facility]. She was placed on constant observation, psych consulted for delirium vs dementia. Seroquel was initiated as needed for agitation. She noted to have a fever, with elevated procal, cultures were sent, which were negative, but empiric Ceftriaxone was given. She completed course of antibiotics.    1. Metabolic encephalopathy vs dementia:  fall precautions  off antibiotics.    2. Falls:  needs DEVEN.  fall precautions.    3. Atrial fibrillation:  Rate control with Digoxin and Eliquis.    4. Hypertension/hyperlipidemia:  Continue Lisinopril, statin.    6. DVT ppx:  On Eliquis.    Discussed with RN.  Discharge planning to [facility] when bed available.
89 yr old female with hypertension, Parkinson's disease, atrial fibrillation presented to the ED with complaints of falls and confusion. In ED, noted to have elevated BUN, clinical signs of dehydration. CT head and spine were negative. PT evaluation was done, recommended [facility]. She was placed on constant observation, psych consulted for delirium vs dementia. Seroquel was initiated as needed for agitation. She noted to have a fever, with elevated procal, cultures were sent, which were negative, but empiric Ceftriaxone was given. She completed course of antibiotics.    1. Metabolic encephalopathy vs dementia:  fall precautions  off antibiotics.    2. Falls:  needs DEVEN.  fall precautions.    3. Atrial fibrillation:  Rate control with Digoxin and Eliquis.    4. Hypertension/hyperlipidemia:  Continue Lisinopril, statin.    6. DVT ppx:  On Eliquis.    Discussed with RN.  Discharge planning to [facility] in 24-48 hrs.  updated niece.
89 year old female with PMH HTN, AF and Parkinson's (Not on any medications) was brought in after fall.  Episodes of confusion in ER. ER work up showed CT head without any acute changes. CT C-Spine without any fracture or dislocations. CT chest with reticular opacities bilaterally. Normal WBC. Elevated BUN/SCr ration and elevated HCO3. Lactate normal. CT knee with small to moderate effusion. She was given Ceftriaxone and Azithromycin in ER.  Patient seen by PT with recommendations for Rehab       Fall, recurrent  Intermittent confusion  Dehydration    monitor off 1:1  - B12, TSH, Ammonia wnl   - Delirium precautions; Quetiapine PRN  - PT recommendation for Rehab    AF, rate controlled on EKG  - Digoxin, level 0.8  - Apixaban;    Overactive bladder  - Oxybutynin    Reticular opacities on CT chest - non specific  Patient without any symptoms, leukocytosis or fever.  Defer further antibiotics    VTE Prophylaxis  - Apixaban  Ambulate patient  with assistance        DEVEN dispo pending  
89 year old female with PMH HTN, AF and Parkinson's (Not on any medications) was brought in after fall.  Episodes of confusion in ER. ER work up showed CT head without any acute changes. CT C-Spine without any fracture or dislocations. CT chest with reticular opacities bilaterally. Normal WBC. Elevated BUN/SCr ration and elevated HCO3. Lactate normal. CT knee with small to moderate effusion. She was given Ceftriaxone and Azithromycin in ER.  Patient seen by PT with recommendations for Rehab     fever:  cultures negative     rocephin empirically x5 days as procalc elevated    speech eval    ua negative     bowel regimen.       Fall, recurrent  Intermittent confusion/ delirium   Dehydration    monitor off 1:1  - B12, TSH, Ammonia wnl   - Delirium precautions; Quetiapine qhs and prn olanzapine  - PT recommendation for Rehab  psych following     AF, rate controlled on EKG  - Digoxin, level 0.8  - Apixaban;    Overactive bladder  - hold oxybutynin as can cause delirium     Reticular opacities on CT chest - non specific    VTE Prophylaxis  - Apixaban  Ambulate patient  with assistance  left message for family    dc planning to [facility] [place] dispo pending  updated family over the phone 
89 yr old female with hypertension, Parkinson's disease, atrial fibrillation presented to the ED with complaints of falls and confusion. In ED, noted to have elevated BUN, clinical signs of dehydration. CT head and spine were negative. PT evaluation was done, recommended [facility]. She was placed on constant observation, psych consulted for delirium vs dementia. Seroquel was initiated as needed for agitation. She noted to have a fever, with elevated procal, cultures were sent, which were negative, but empiric Ceftriaxone was given.    1. Metabolic encephalopathy vs dementia:  fall precautions  discontinue antibiotics, received 7 days, cultures negative and afebrile.    2. Falls:  needs DEVEN.  fall precautions.    3. Atrial fibrillation:  Rate control with Digoxin and Eliquis.    4. Hypertension/hyperlipidemia:  Continue Lisinopril, statin.    6. DVT ppx:  On Eliquis.    Discussed with RN.  Discharge planning to [facility] in 24-48 hrs.
89 yr old female with hypertension, Parkinson's disease, atrial fibrillation presented to the ED with complaints of falls and confusion. In ED, noted to have elevated BUN, clinical signs of dehydration. CT head and spine were negative. PT evaluation was done, recommended [facility]. She was placed on constant observation, psych consulted for delirium vs dementia. Seroquel was initiated as needed for agitation. She noted to have a fever, with elevated procal, cultures were sent, which were negative, but empiric Ceftriaxone was given.    1. Metabolic encephalopathy vs dementia:  CO for safety  fall precautions  continue antibiotics  if remains afebrile will de escalate.    2. Falls:  needs DEVEN.    3. Atrial fibrillation:  Rate control with Digoxin and Eliquis.    4. Hypertension/hyperlipidemia:  Continue Lisinopril, statin.    6. DVT ppx:  On Eliquis.    Discussed with RN.  Discharge planning to [facility] in 24-48 hrs.
89 yr old female with hypertension, Parkinson's disease, atrial fibrillation presented to the ED with complaints of falls and confusion. In ED, noted to have elevated BUN, clinical signs of dehydration. CT head and spine were negative. PT evaluation was done, recommended [facility]. She was placed on constant observation, psych consulted for delirium vs dementia. Seroquel was initiated as needed for agitation. She noted to have a fever, with elevated procal, cultures were sent, which were negative, but empiric Ceftriaxone was given. She completed course of antibiotics.    1. Metabolic encephalopathy vs dementia:  fall precautions  off antibiotics.    2. Falls:  needs DEVEN.  fall precautions.    3. Atrial fibrillation:  Rate control with Digoxin and Eliquis.    4. Hypertension/hyperlipidemia:  Continue Lisinopril, statin.    6. DVT ppx:  On Eliquis.    Discussed with RN.  Discharge planning to [facility] when bed available.
89 yr old female with hypertension, Parkinson's disease, atrial fibrillation presented to the ED with complaints of falls and confusion. In ED, noted to have elevated BUN, clinical signs of dehydration. CT head and spine were negative. PT evaluation was done, recommended [facility]. She was placed on constant observation, psych consulted for delirium vs dementia. Seroquel was initiated as needed for agitation. She noted to have a fever, with elevated procal, cultures were sent, which were negative, but empiric Ceftriaxone was given. She completed course of antibiotics.    1. Metabolic encephalopathy vs dementia:  fall precautions  off antibiotics.    2. Falls:  needs DEVEN.  fall precautions.    3. Atrial fibrillation:  Rate control with Digoxin and Eliquis.    4. Hypertension/hyperlipidemia:  Continue Lisinopril, statin.    6. DVT ppx:  On Eliquis.    Discussed with RN.  stable for discharge.

## 2023-03-02 NOTE — PROGRESS NOTE ADULT - NUTRITIONAL ASSESSMENT
This patient has been assessed with a concern for Malnutrition and has been determined to have a diagnosis/diagnoses of Severe protein-calorie malnutrition.    This patient is being managed with:   Diet Regular-  Prosource Gelatein Plus     Qty per Day:  3  Entered: Mar  1 2023  4:52PM    
This patient has been assessed with a concern for Malnutrition and has been determined to have a diagnosis/diagnoses of Severe protein-calorie malnutrition.    This patient is being managed with:   Diet Minced and Moist-  Mildly Thick Liquids (MILDTHICKLIQS)  Prosource Gelatein Plus     Qty per Day:  3  Entered: Feb 24 2023  2:50PM    
This patient has been assessed with a concern for Malnutrition and has been determined to have a diagnosis/diagnoses of Severe protein-calorie malnutrition.    This patient is being managed with:   Diet Minced and Moist-  Mildly Thick Liquids (MILDTHICKLIQS)  Prosource Gelatein Plus     Qty per Day:  3  Entered: Feb 24 2023  2:50PM    
This patient has been assessed with a concern for Malnutrition and has been determined to have a diagnosis/diagnoses of Severe protein-calorie malnutrition.    This patient is being managed with:   Diet Minced and Moist-  Mildly Thick Liquids (MILDTHICKLIQS)  Prosource Gelatein Plus     Qty per Day:  3  Supplement Feeding Modality:  Oral  Ensure Enlive Cans or Servings Per Day:  1       Frequency:  Two Times a day  Entered: Mar  1 2023 10:58AM    
This patient has been assessed with a concern for Malnutrition and has been determined to have a diagnosis/diagnoses of Severe protein-calorie malnutrition.    This patient is being managed with:   Diet Minced and Moist-  Mildly Thick Liquids (MILDTHICKLIQS)  Prosource Gelatein Plus     Qty per Day:  3  Entered: Feb 24 2023  2:50PM    
This patient has been assessed with a concern for Malnutrition and has been determined to have a diagnosis/diagnoses of Severe protein-calorie malnutrition.    This patient is being managed with:   Diet Minced and Moist-  Mildly Thick Liquids (MILDTHICKLIQS)  Prosource Gelatein Plus     Qty per Day:  3  Entered: Feb 24 2023  2:50PM

## 2023-03-08 NOTE — CDI QUERY NOTE - NSCDIOTHERTXTBX_GEN_ALL_CORE_HH
There is conflicting documentation regarding the patient's nutrition status. The patient received nutrition assessment by registered dietician on 2/19/23.  The documentation of this assessment states: severe protein calorie malnutrition. Chart documentation also states the patient is well-nourished.     Can the nutrition diagnosis and criteria be clarified, after study?  - Patient was not well nourished and was found to be severe protein calorie malnutrition (Please refer to the dietician assessment for further details)  - Other, please specify  - Not clinically significant     Supporting Documentation:    ED CDU Provider Subsequent Day Note [Charted Location: [facility] ERHR 1605 01] [Authored: 19-Feb-2023 05:29  PHYSICAL EXAM:    Physical Exam:  · Physical Examination: Constitutional - well-developed; well nourished. Head - NCAT. Airway patent. Eyes - PERRL. CV - RRR. no murmur. no edema. Pulm - CTAB. Abd - soft, nt. no rebound. no guarding. Neuro - A&Ox3. strength 5/5 x4. sensation intact x4. normal gait. Skin - No rash. MSK - +L knee +bruising and hematoma, TTP along b/l ribs, normal ROM.  [place] (DO)    Dietitian Nutrition Risk Notification [Charted Location: [facility] [id number]] [Authored: 24-Feb-2023 13:43]- for Visit: [id number], Complete, Entered, Signed in Full, Available to Patient    Dietitian Nutritition Risk Notification:    Nutrition Diagnosis & Parameters:  Findings Based on Comprehensive Nutrition Assessment, Consultation Performed on	24-Feb-2023  Upon Nutritional Assessment by the Registered Dietitian Your Patient was Determined to Meet Criteria/Has Evidence of the Following Diagnosis:	Severe protein-calorie malnutrition  Other Risk Factors	Not applicable  Etiology-Basis	Acute illness or injury  Malnutrition Evaluation as Demonstrated by (Adults > 20 years of age)	Inadequate energy intake...  Loss of subcutaneous fat...  Loss of muscle...  Inadequate Energy Intake	</= 50% for >/= 5 days  Body Fat (loss of subcutaneous fat)	Buccal...  Buccal Depletion is	moderate  Muscle Mass (Loss of Muscle)	Temples...  Clavicles...  Temples Depletion is	moderate  Clavicles Depletion is	moderate     Nutrition Interventions:  Treatment: The Following Diet Has Been Recommended	Diet, Minced and Moist:   Mildly Thick Liquids (MILDTHICKLIQS) (02-22-23 @ 15:57) [Active]  Additional Comments/Dietitian Recommendations	1) Add Ensure TID  2) RD to provide Magic Cup TID  3) Rx MVI and vit C 500mg daily  4) Provide encouragement/assistance as needed during mealtimes to inc PO   5) SLP intervention for possible diet advancement  6) Monitor weights daily for trend/accuracy        Electronic Signatures:  Herminia Davidson (Registered Dietitian)  (Signed 24-Feb-2023 13:46)

## 2023-03-14 NOTE — CDI QUERY NOTE - NSCDIOTHERTXTBX_GEN_ALL_CORE_HH
Patient was confused and tachycardic during presentation and sepsis was noted in ED, then fever developed on 2/21. After study please clarify the status of sepsis and specify the source of infection if known.  A.	Sepsis ruled out  B.	Early sepsis present on admission due to (please specify)  C.	Other, please specify  D.	Not clinically significant        ED ADULT Flow Sheet [Charted Location: [address]] [Authored: 18-Feb-2023 18:59]  Heart Rate  Heart Rate Heart Rate (beats/min): 96 /min      ED PEDIATRIC Flow Sheet [Charted Location: [address]] [Authored: 19-Feb-2023 10:12]  Respiratory/Pulse Oximetry/Oxygen Therapy  Respiration Rate (breaths/min) Respiration Rate (breaths/min): 20 /min  SpO2 (%) SpO2 (%): 94 %      Vital Signs Adult [Charted Location: [address] [address]] [Authored: 21-Feb-2023 18:30]  Temperature  Temp (F): 102.2 Degrees F  Temp (C) Temp (C): 39 Degrees C  Temp site Temp Site: rectal      Vital Signs Adult [Charted Location: [place] [address]] [Authored: 21-Feb-2023 20:04]  Temperature  Temp (F): 102.4 Degrees F  Temp (C) Temp (C): 39.1 Degrees C  Temp site Temp Site: rectal      Vital Signs Adult [Charted Location: [place] [address]] [Authored: 21-Feb-2023 22:30]  Temperature  Temp (F): 101.4 Degrees F  Temp (C) Temp (C): 38.6 Degrees C  Temp site Temp Site: rectal      Vital Signs Adult [Charted Location: [address] [address]] [Authored: 21-Feb-2023 23:06]  Temperature  Temp (F): 101 Degrees F  Temp (C) Temp (C): 38.3 Degrees C  Temp site Temp Site: rectal      WBC Count:  WBC Count: 5.17 K/uL (03.01.23 @ 16:20)   WBC Count: 4.88 K/uL (02.24.23 @ 06:22)   WBC Count: 8.44 K/uL (02.21.23 @ 19:50)   WBC Count: 4.65 K/uL (02.18.23 @ 11:40)      Blood Gas Venous - Lactate:  Blood Gas Venous - Lactate: 1.10 mmol/L (02.22.23 @ 05:00)   Blood Gas Venous - Lactate: 2.50 mmol/L (02.21.23 @ 19:50)   Blood Gas Venous - Lactate: 1.30 mmol/L (02.19.23 @ 12:53)         ED CDU Provider Disposition Note [Charted Location: [facility] [address] [address]] [Authored: 19-Feb-2023 10:29]  SEPSIS – was this patient treated for sepsis? Yes.    Clinical Course:  This is a 89 year old female with pmhx of Parkinsons here for fall, endorsing L knee pain and rib pain.  She lives alone and has no assistance, being placed into observation for SW/PT evaluation.  Patient developed suicidal idealations and BH contacted in the morning for evaluation.  Likely delirium, CT show opacity will cover with IV abx, add on lactate and blood cultures.  Placed on 1:1.  PT recommending DEVEN.  Plan to d/w hospitalist JAYCE Guerrier.        cefTRIAXone Injectable. 1000 milliGRAM(s) IV Push every 24 hours. Indication: Empiric dosing. Given 2/22-26/23  cefTRIAXone Injectable. 1000 milliGRAM(s) IV x1 dose. Indication: opacity on CT. Given 2/19/23  Azithromycin 500 mg iv once x1 dose. Indication: Opacity on CT. Given 2/19/23

## 2023-07-25 ENCOUNTER — APPOINTMENT (OUTPATIENT)
Dept: DERMATOLOGY | Facility: CLINIC | Age: 88
End: 2023-07-25

## 2025-02-10 NOTE — ED ADULT NURSE NOTE - CAS EDN DISCHARGE INTERVENTIONS
Called and left voicemail regarding afib ablation opening for tomorrow with Dr. Pace.    IV discontinued, cath removed intact

## 2025-03-06 NOTE — PROGRESS NOTE ADULT - NUTRITIONAL ASSESSMENT
This patient has been assessed with a concern for Malnutrition and has been determined to have a diagnosis/diagnoses of Moderate protein-calorie malnutrition.    This patient is being managed with:   Diet DASH/TLC-  Sodium & Cholesterol Restricted  Entered: Aug 14 2022  9:56PM    
This patient has been assessed with a concern for Malnutrition and has been determined to have a diagnosis/diagnoses of Moderate protein-calorie malnutrition.    This patient is being managed with:   Diet DASH/TLC-  Sodium & Cholesterol Restricted  Entered: Aug 14 2022  9:56PM    
good balance
This patient has been assessed with a concern for Malnutrition and has been determined to have a diagnosis/diagnoses of Moderate protein-calorie malnutrition.    This patient is being managed with:   Diet DASH/TLC-  Sodium & Cholesterol Restricted  Entered: Aug 14 2022  9:56PM    
This patient has been assessed with a concern for Malnutrition and has been determined to have a diagnosis/diagnoses of Moderate protein-calorie malnutrition.    This patient is being managed with:   Diet DASH/TLC-  Sodium & Cholesterol Restricted  Entered: Aug 14 2022  9:56PM

## 2025-03-28 NOTE — CONSULT NOTE ADULT - CONSULT REQUESTED DATE/TIME
Problem: SAFETY ADULT  Goal: Patient will remain free of falls  Description: INTERVENTIONS:- Educate patient/family on patient safety including physical limitations- Instruct patient to call for assistance with activity - Consult OT/PT to assist with strengthening/mobility - Keep Call bell within reach- Keep bed low and locked with side rails adjusted as appropriate- Keep care items and personal belongings within reach- Initiate and maintain comfort rounds- Make Fall Risk Sign visible to staff- Offer Toileting every  Hours, in advance of need- Initiate/Maintain alarm- Obtain necessary fall risk management equipment: - Apply yellow socks and bracelet for high fall risk patients- Consider moving patient to room near nurses station  Outcome: Completed  Goal: Maintain or return to baseline ADL function  Description: INTERVENTIONS:-  Assess patient's ability to carry out ADLs; assess patient's baseline for ADL function and identify physical deficits which impact ability to perform ADLs (bathing, care of mouth/teeth, toileting, grooming, dressing, etc.)- Assess/evaluate cause of self-care deficits - Assess range of motion- Assess patient's mobility; develop plan if impaired- Assess patient's need for assistive devices and provide as appropriate- Encourage maximum independence but intervene and supervise when necessary- Involve family in performance of ADLs- Assess for home care needs following discharge - Consider OT consult to assist with ADL evaluation and planning for discharge- Provide patient education as appropriate  Outcome: Completed  Goal: Maintains/Returns to pre admission functional level  Description: INTERVENTIONS:- Perform AM-PAC 6 Click Basic Mobility/ Daily Activity assessment daily.- Set and communicate daily mobility goal to care team and patient/family/caregiver. - Collaborate with rehabilitation services on mobility goals if consulted- Perform Range of Motion  times a day.- Reposition patient every   hours.- Dangle patient  times a day- Stand patient  times a day- Ambulate patient  times a day- Out of bed to chair  times a day - Out of bed for meals  times a day- Out of bed for toileting- Record patient progress and toleration of activity level   Outcome: Completed     Problem: Alteration in Thoughts and Perception  Goal: Treatment Goal: Gain control of psychotic behaviors/thinking, reduce/eliminate presenting symptoms and demonstrate improved reality functioning upon discharge  Outcome: Completed  Goal: Verbalize thoughts and feelings  Description: Interventions:- Promote a nonjudgmental and trusting relationship with the patient through active listening and therapeutic communication- Assess patient's level of functioning, behavior and potential for risk- Engage patient in 1 on 1 interactions- Encourage patient to express fears, feelings, frustrations, and discuss symptoms  - [place] patient to reality, help patient recognize reality-based thinking - Administer medications as ordered and assess for potential side effects- Provide the patient education related to the signs and symptoms of the illness and desired effects of prescribed medications  Outcome: Completed  Goal: Refrain from acting on delusional thinking/internal stimuli  Description: Interventions:- Monitor patient closely, per order - Utilize least restrictive measures - Set reasonable limits, give positive feedback for acceptable - Administer medications as ordered and monitor of potential side effects  Outcome: Completed  Goal: Recognize dysfunctional thoughts, communicate reality-based thoughts at the time of discharge  Description: Interventions:- Provide medication and psycho-education to assist patient in compliance and developing insight into his/her illness   Outcome: Completed     Problem: Ineffective Coping  Goal: Cooperates with admission process  Description: Interventions: - Complete admission process  Outcome: Completed  Goal: Identifies  14-Aug-2022 ineffective coping skills  Outcome: Completed  Goal: Identifies healthy coping skills  Outcome: Completed  Goal: Demonstrates healthy coping skills  Outcome: Completed  Goal: Patient/Family participate in treatment and DC plans  Description: Interventions:- Provide therapeutic environment  Outcome: Completed  Goal: Patient/Family verbalizes awareness of resources  Outcome: Completed  Goal: Understands least restrictive measures  Description: Interventions:- Utilize least restrictive behavior  Outcome: Completed  Goal: Free from restraint events  Description: - Utilize least restrictive measures - Provide behavioral interventions - Redirect inappropriate behaviors   Outcome: Completed     Problem: Anxiety  Goal: Anxiety is at manageable level  Description: Interventions:- Assess and monitor patient's anxiety level. - Monitor for signs and symptoms (heart palpitations, chest pain, shortness of breath, headaches, nausea, feeling jumpy, restlessness, irritable, apprehensive). - Collaborate with interdisciplinary team and initiate plan and interventions as ordered.- [place] patient to unit/surroundings- Explain treatment plan- Encourage participation in care- Encourage verbalization of concerns/fears- Identify coping mechanisms- Assist in developing anxiety-reducing skills- Administer/offer alternative therapies- Limit or eliminate stimulants  Outcome: Completed     Problem: Alteration in Orientation  Goal: Treatment Goal: Demonstrate a reduction of confusion and improved orientation to person, place, time and/or situation upon discharge, according to optimum baseline/ability  Outcome: Completed  Goal: Interact with staff daily  Description: Interventions:- Assess and re-assess patient's level of orientation- Engage patient in 1 on 1 interactions, daily, for a minimum of 15 minutes - Establish rapport/trust with patient   Outcome: Completed  Goal: Express concerns related to confused thinking related to:  Description:  Interventions:- Encourage patient to express feelings, fears, frustrations, hopes- Assign consistent caregivers - [place]/re-orient patient as needed- Allow comfort items, as appropriate- Provide visual cues, signs, etc.   Outcome: Completed  Goal: Allow medical examinations, as recommended  Description: Interventions:- Provide physical/neurological exams and/or referrals, per provider   Outcome: Completed  Goal: Cooperate with recommended testing/procedures  Description: Interventions:- Determine need for ancillary testing- Observe for mental status changes- Implement falls/precaution protocol   Outcome: Completed  Goal: Complete daily ADLs, including personal hygiene independently, as able  Description: Interventions:- Observe, teach, and assist patient with ADLS  Outcome: Completed     Problem: DISCHARGE PLANNING  Goal: Discharge to home or other facility with appropriate resources  Description: INTERVENTIONS:- Identify barriers to discharge w/patient and caregiver- Arrange for needed discharge resources and transportation as appropriate- Identify discharge learning needs (meds, wound care, etc.- Refer to Case Management Department for coordinating discharge planning if the patient needs post-hospital services based on physician/advanced practitioner order or complex needs related to functional status, cognitive ability, or social support system  Outcome: Completed